# Patient Record
Sex: FEMALE | Race: WHITE | Employment: UNEMPLOYED | URBAN - METROPOLITAN AREA
[De-identification: names, ages, dates, MRNs, and addresses within clinical notes are randomized per-mention and may not be internally consistent; named-entity substitution may affect disease eponyms.]

---
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
## 2022-01-04 ENCOUNTER — OFFICE VISIT (OUTPATIENT)
Dept: GASTROENTEROLOGY | Age: 22
End: 2022-01-04
Payer: COMMERCIAL

## 2022-01-04 VITALS — OXYGEN SATURATION: 100 % | HEIGHT: 64 IN | HEART RATE: 62 BPM | WEIGHT: 233 LBS | BODY MASS INDEX: 39.78 KG/M2

## 2022-01-04 DIAGNOSIS — K21.9 GASTROESOPHAGEAL REFLUX DISEASE WITHOUT ESOPHAGITIS: ICD-10-CM

## 2022-01-04 DIAGNOSIS — K75.9 HEPATITIS: ICD-10-CM

## 2022-01-04 DIAGNOSIS — K75.9 HEPATITIS: Primary | ICD-10-CM

## 2022-01-04 LAB — HEPATITIS B SURFACE ANTIGEN INTERPRETATION: NORMAL

## 2022-01-04 PROCEDURE — G8484 FLU IMMUNIZE NO ADMIN: HCPCS | Performed by: SPECIALIST

## 2022-01-04 PROCEDURE — 99213 OFFICE O/P EST LOW 20 MIN: CPT | Performed by: SPECIALIST

## 2022-01-04 PROCEDURE — G8427 DOCREV CUR MEDS BY ELIG CLIN: HCPCS | Performed by: SPECIALIST

## 2022-01-04 PROCEDURE — G8417 CALC BMI ABV UP PARAM F/U: HCPCS | Performed by: SPECIALIST

## 2022-01-04 PROCEDURE — 1036F TOBACCO NON-USER: CPT | Performed by: SPECIALIST

## 2022-01-04 ASSESSMENT — ENCOUNTER SYMPTOMS
ABDOMINAL PAIN: 0
ABDOMINAL DISTENTION: 0
CONSTIPATION: 1
EYES NEGATIVE: 1
RECTAL PAIN: 0
NAUSEA: 0
DIARRHEA: 0
VOMITING: 0
ANAL BLEEDING: 0
BLOOD IN STOOL: 0
RESPIRATORY NEGATIVE: 1

## 2022-01-04 NOTE — PROGRESS NOTES
Gastroenterology Clinic Follow up Visit    Jael Us  [id number]  Chief Complaint   Patient presents with    Follow-up     Patient following up for alternating constipation and diarrhea. Mentions one episode of prolapse last year. Denies blood per rectum. HPI and A/P at last visit summarized below:  Patient is here for follow-up, constipation is controlled with fiber supplement and MiraLAX as needed, could not continue taking Linzess because of insurance issues. Right upper quadrant ultrasound was unremarkable, left ear shows minimally elevated transaminases most probably steatosis, does not drink alcohol does not use any hormonal preparations. Review of Systems   Constitutional: Negative. HENT: Negative. Eyes: Negative. Respiratory: Negative. Cardiovascular: Negative. Gastrointestinal: Positive for constipation. Negative for abdominal distention, abdominal pain, anal bleeding, blood in stool, diarrhea, nausea, rectal pain and vomiting. Endocrine: Negative. Genitourinary: Negative. Musculoskeletal: Negative. Skin: Negative. Allergic/Immunologic: Negative for food allergies. Neurological: Negative. Hematological: Negative. Psychiatric/Behavioral: Negative. Past medical history, past surgical history, medication list, social and familyhistory reviewed    Pulse 62, height 5' 4\" (1.626 m), weight 233 lb (105.7 kg), SpO2 100 %. Physical Exam  Constitutional:       Appearance: She is well-developed. HENT:      Head: Normocephalic and atraumatic. Eyes:      Conjunctiva/sclera: Conjunctivae normal.      Pupils: Pupils are equal, round, and reactive to light. Cardiovascular:      Rate and Rhythm: Normal rate. Pulmonary:      Effort: Pulmonary effort is normal.   Abdominal:      General: Bowel sounds are normal.      Palpations: Abdomen is soft. Comments: Soft nontender no palpable mass   Musculoskeletal:         General: Normal range of motion. Cervical back: Normal range of motion. Skin:     General: Skin is warm. Neurological:      Mental Status: She is alert. Laboratory, Pathology, Radiology reviewed in detail with relevantimportant investigations summarized below:    No results for input(s): WBC, HGB, HCT, MCV, PLT in the last 720 hours. Lab Results   Component Value Date    ALT 45 (H) 11/23/2021    AST 41 (H) 11/23/2021    ALKPHOS 89 11/23/2021    BILITOT 0.3 11/23/2021     No results found. Endoscopic investigations:     Assessment and Plan:  Maximino Bernal 25 y.o. female for follow up. Constipation controlled with current fiber supplements and MiraLAX as needed. Mildly elevated transaminases with normal alk phos and bilirubin and right upper quadrant ultrasound showed no abnormality of the liver or evidence of gallstones, most probably nonalcoholic fatty liver disease. Will investigate for metabolic autoimmune and viral hepatitis. Advised to lose weight. Diagnosis Orders   1. Hepatitis  Alpha-1-Antitrypsin    MARÍA ELENA    Liver-Kidney Microsome (LKM-1) Ab (IgG)    Anti-smooth muscle antibody    Hepatitis C Antibody    Hepatitis B Surface Antigen    Ceruloplasmin    Copper, Serum    MITOCHONDRIAL ANTIBODY W/REFLEX TITER    Iron And Tibc       Return in about 2 months (around 3/4/2022). Jerri Mann MD   StaffGastroenterologist  [facility]    Please note this report has been partially produced using speech recognitionsoftware  and may cause contain errors related to that system including grammar, punctuation and spelling as well as words andphrases that may seem inappropriate. If there are questions or concerns please feel free to contact me to clarify.

## 2022-01-07 LAB
ALPHA-1 ANTITRYPSIN: 158 MG/DL (ref 90–200)
COPPER: 154 UG/DL (ref 80–155)
LIVER-KIDNEY MICROSOME-1 AB IGG: 1.1 U (ref 0–24.9)
MITOCHONDRIAL M2 AB, IGG: <0.5 U/ML (ref 0–4)

## 2022-01-08 LAB
ANTINUCLEAR AB INTERPRETIVE COMMENT: NORMAL
ANTINUCLEAR ANTIBODY, HEP-2, IGG: NORMAL

## 2022-01-11 LAB — F-ACTIN AB IGA: 10.2 UNITS (ref 0–24.9)

## 2022-03-31 ENCOUNTER — OFFICE VISIT (OUTPATIENT)
Dept: GASTROENTEROLOGY | Age: 22
End: 2022-03-31
Payer: COMMERCIAL

## 2022-03-31 VITALS
HEART RATE: 77 BPM | HEIGHT: 64 IN | OXYGEN SATURATION: 96 % | WEIGHT: 226.4 LBS | SYSTOLIC BLOOD PRESSURE: 116 MMHG | BODY MASS INDEX: 38.65 KG/M2 | DIASTOLIC BLOOD PRESSURE: 70 MMHG

## 2022-03-31 DIAGNOSIS — K59.00 CONSTIPATION, UNSPECIFIED CONSTIPATION TYPE: ICD-10-CM

## 2022-03-31 DIAGNOSIS — R79.89 ABNORMAL LFTS: Primary | ICD-10-CM

## 2022-03-31 DIAGNOSIS — R79.89 ABNORMAL LFTS: ICD-10-CM

## 2022-03-31 LAB
ALBUMIN SERPL-MCNC: 4.3 G/DL (ref 3.5–4.6)
ALP BLD-CCNC: 95 U/L (ref 40–130)
ALT SERPL-CCNC: 22 U/L (ref 0–33)
AST SERPL-CCNC: 19 U/L (ref 0–35)
BILIRUB SERPL-MCNC: <0.2 MG/DL (ref 0.2–0.7)
BILIRUBIN DIRECT: <0.2 MG/DL (ref 0–0.4)
BILIRUBIN, INDIRECT: NORMAL MG/DL (ref 0–0.6)
T4 FREE: 1.22 NG/DL (ref 0.84–1.68)
TOTAL PROTEIN: 7.4 G/DL (ref 6.3–8)
TSH REFLEX: 5.15 UIU/ML (ref 0.44–3.86)

## 2022-03-31 PROCEDURE — G8417 CALC BMI ABV UP PARAM F/U: HCPCS | Performed by: SPECIALIST

## 2022-03-31 PROCEDURE — 99213 OFFICE O/P EST LOW 20 MIN: CPT | Performed by: SPECIALIST

## 2022-03-31 PROCEDURE — G8427 DOCREV CUR MEDS BY ELIG CLIN: HCPCS | Performed by: SPECIALIST

## 2022-03-31 PROCEDURE — 1036F TOBACCO NON-USER: CPT | Performed by: SPECIALIST

## 2022-03-31 PROCEDURE — G8484 FLU IMMUNIZE NO ADMIN: HCPCS | Performed by: SPECIALIST

## 2022-03-31 RX ORDER — POLYETHYLENE GLYCOL 3350 17 G/17G
17 POWDER, FOR SOLUTION ORAL DAILY PRN
COMMUNITY

## 2022-03-31 ASSESSMENT — ENCOUNTER SYMPTOMS
ABDOMINAL PAIN: 0
ANAL BLEEDING: 0
RECTAL PAIN: 0
RESPIRATORY NEGATIVE: 1
NAUSEA: 0
CONSTIPATION: 1
EYES NEGATIVE: 1
ABDOMINAL DISTENTION: 0
DIARRHEA: 0
BLOOD IN STOOL: 0
VOMITING: 0

## 2022-03-31 NOTE — PROGRESS NOTES
Gastroenterology Clinic Follow up Visit    Sandy Woody  [id number]  Chief Complaint   Patient presents with    Follow-up       HPI and A/P at last visit summarized below:  Patient is here for follow-up. Mildly elevated transaminases. She was investigated for autoimmune liver disease that was negative hepatitis B surface antigen was negative and also hep C antibody still pending, patient takes stool softener and probiotics and high-fiber diet, prune juice. Also takes MiraLAX as needed. No history of rectal bleeding. Review of Systems   Constitutional: Negative. HENT: Negative. Eyes: Negative. Respiratory: Negative. Cardiovascular: Negative. Gastrointestinal: Positive for constipation. Negative for abdominal distention, abdominal pain, anal bleeding, blood in stool, diarrhea, nausea, rectal pain and vomiting. Endocrine: Negative. Genitourinary: Negative. Musculoskeletal: Negative. Skin: Negative. Allergic/Immunologic: Negative for food allergies. Neurological: Negative. Hematological: Negative. Psychiatric/Behavioral: Negative. Past medical history, past surgical history, medication list, social and familyhistory reviewed    Blood pressure 116/70, pulse 77, height 5' 4\" (1.626 m), weight 226 lb 6.4 oz (102.7 kg), SpO2 96 %. Physical Exam  Constitutional:       Appearance: She is well-developed. HENT:      Head: Normocephalic and atraumatic. Eyes:      Conjunctiva/sclera: Conjunctivae normal.      Pupils: Pupils are equal, round, and reactive to light. Cardiovascular:      Rate and Rhythm: Normal rate. Pulmonary:      Effort: Pulmonary effort is normal.   Abdominal:      General: Bowel sounds are normal.      Palpations: Abdomen is soft. Comments: Soft nontender no palpable mass   Musculoskeletal:         General: Normal range of motion. Cervical back: Normal range of motion. Skin:     General: Skin is warm.    Neurological:      Mental

## 2022-04-01 DIAGNOSIS — R94.6 BORDERLINE ABNORMAL TFTS: Primary | ICD-10-CM

## 2022-04-01 LAB
HEPATITIS C ANTIBODY: NONREACTIVE
IRON SATURATION: 19 % (ref 20–55)
IRON: 62 UG/DL (ref 37–145)
TOTAL IRON BINDING CAPACITY: 321 UG/DL (ref 250–450)
UNSATURATED IRON BINDING CAPACITY: 259 UG/DL (ref 112–347)

## 2022-04-19 ENCOUNTER — OFFICE VISIT (OUTPATIENT)
Dept: ENDOCRINOLOGY | Age: 22
End: 2022-04-19
Payer: COMMERCIAL

## 2022-04-19 VITALS
SYSTOLIC BLOOD PRESSURE: 115 MMHG | HEIGHT: 64 IN | WEIGHT: 227 LBS | DIASTOLIC BLOOD PRESSURE: 81 MMHG | HEART RATE: 65 BPM | OXYGEN SATURATION: 98 % | BODY MASS INDEX: 38.76 KG/M2

## 2022-04-19 DIAGNOSIS — E88.81 INSULIN RESISTANCE: ICD-10-CM

## 2022-04-19 DIAGNOSIS — E04.9 GOITER: ICD-10-CM

## 2022-04-19 DIAGNOSIS — E03.9 HYPOTHYROIDISM, UNSPECIFIED TYPE: Primary | ICD-10-CM

## 2022-04-19 DIAGNOSIS — R61 HYPERHIDROSIS: ICD-10-CM

## 2022-04-19 DIAGNOSIS — E66.01 MORBID OBESITY (HCC): ICD-10-CM

## 2022-04-19 DIAGNOSIS — G47.33 OBSTRUCTIVE SLEEP APNEA SYNDROME: ICD-10-CM

## 2022-04-19 PROCEDURE — G8427 DOCREV CUR MEDS BY ELIG CLIN: HCPCS | Performed by: INTERNAL MEDICINE

## 2022-04-19 PROCEDURE — G8417 CALC BMI ABV UP PARAM F/U: HCPCS | Performed by: INTERNAL MEDICINE

## 2022-04-19 PROCEDURE — 1036F TOBACCO NON-USER: CPT | Performed by: INTERNAL MEDICINE

## 2022-04-19 PROCEDURE — 99203 OFFICE O/P NEW LOW 30 MIN: CPT | Performed by: INTERNAL MEDICINE

## 2022-04-19 RX ORDER — LEVOTHYROXINE SODIUM 0.03 MG/1
25 TABLET ORAL DAILY
Qty: 30 TABLET | Refills: 5 | Status: SHIPPED | OUTPATIENT
Start: 2022-04-19

## 2022-04-19 ASSESSMENT — ENCOUNTER SYMPTOMS: CONSTIPATION: 1

## 2022-04-19 NOTE — PROGRESS NOTES
4/19/2022    Assessment:       Diagnosis Orders   1. Hypothyroidism, unspecified type     2. Morbid obesity ([address]     3. Obstructive sleep apnea syndrome     4. Hyperhidrosis     5. Insulin resistance         PLAN:     Orders Placed This Encounter   Procedures    US HEAD NECK SOFT TISSUE THYROID     Standing Status:   Future     Number of Occurrences:   1     Standing Expiration Date:   4/19/2023    T4, Free     Standing Status:   Future     Standing Expiration Date:   4/19/2023    TSH with Reflex     Standing Status:   Future     Standing Expiration Date:   4/19/2023    Thyroid Peroxidase Antibody     Standing Status:   Future     Standing Expiration Date:   4/19/2023    Insulin, Total     Standing Status:   Future     Standing Expiration Date:   4/19/2023   Janey Engel MD, Pulmonology, [place]     Referral Priority:   Routine     Referral Type:   Eval and Treat     Referral Reason:   Specialty Services Required     Referred to Provider:   Korey Ramirez MD     Requested Specialty:   Pulmonology     Number of Visits Requested:   1     Orders Placed This Encounter   Medications    levothyroxine (SYNTHROID) 25 MCG tablet     Sig: Take 1 tablet by mouth daily     Dispense:  30 tablet     Refill:  5     Start patient on Synthroid 25 mcg daily refer patient to pulmonary for evaluation of possible sleep management sleep apnea  More than 50% of 35-minute spent patient education counseling    Subjective:     Chief Complaint   Patient presents with    New Patient     Borderline abnormal TFTs     Vitals:    04/19/22 1056   BP: 115/81   Pulse: 65   SpO2: 98%   Weight: 227 lb (103 kg)   Height: 5' 4\" (1.626 m)     Wt Readings from Last 3 Encounters:   04/19/22 227 lb (103 kg) (99 %, Z= 2.29)*   03/31/22 226 lb 6.4 oz (102.7 kg) (99 %, Z= 2.28)*   01/04/22 233 lb (105.7 kg) (>99 %, Z= 2.35)*     * Growth percentiles are based on CDC (Girls, 2-20 Years) data.      BP Readings from Last 3 Encounters: 04/19/22 115/81   03/31/22 116/70   11/23/21 106/76     Patient referred here for hypothyroidism thyroid function tests were done twice showing elevated TSH does complain of fatigue difficulty losing weight does complain of constipation has been seeing gastroenterologist recently rest of the chemistries have been normal  Patient also has signs symptoms of sleep apnea including daytime sleepiness increasing fatigue possible snoring at night    Other  This is a new (Hypothyroidism) problem. The current episode started more than 1 month ago. The problem occurs intermittently. The problem has been waxing and waning. Associated symptoms include fatigue. Pertinent negatives include no neck pain or swollen glands. Nothing aggravates the symptoms. She has tried nothing for the symptoms. The treatment provided no relief. History reviewed. No pertinent past medical history. History reviewed. No pertinent surgical history. Social History     Socioeconomic History    Marital status: Single     Spouse name: Not on file    Number of children: Not on file    Years of education: Not on file    Highest education level: Not on file   Occupational History    Not on file   Tobacco Use    Smoking status: Never Smoker    Smokeless tobacco: Never Used   Substance and Sexual Activity    Alcohol use: Never    Drug use: Never    Sexual activity: Not on file   Other Topics Concern    Not on file   Social History Narrative    Not on file     Social Determinants of Health     Financial Resource Strain:     Difficulty of Paying Living Expenses: Not on file   Food Insecurity:     Worried About Running Out of Food in the Last Year: Not on file    Rob of Food in the Last Year: Not on file   Transportation Needs:     Lack of Transportation (Medical): Not on file    Lack of Transportation (Non-Medical):  Not on file   Physical Activity:     Days of Exercise per Week: Not on file    Minutes of Exercise per Session: Not on file   Stress:     Feeling of Stress : Not on file   Social Connections:     Frequency of Communication with Friends and Family: Not on file    Frequency of Social Gatherings with Friends and Family: Not on file    Attends Rastafari Services: Not on file    Active Member of Clubs or Organizations: Not on file    Attends Club or Organization Meetings: Not on file    Marital Status: Not on file   Intimate Partner Violence:     Fear of Current or Ex-Partner: Not on file    Emotionally Abused: Not on file    Physically Abused: Not on file    Sexually Abused: Not on file   Housing Stability:     Unable to Pay for Housing in the Last Year: Not on file    Number of [place] in the Last Year: Not on file    Unstable Housing in the Last Year: Not on file     History reviewed. No pertinent family history. No Known Allergies    Current Outpatient Medications:     polyethylene glycol (GLYCOLAX) 17 GM/SCOOP powder, Take 17 g by mouth daily as needed, Disp: , Rfl:     pantoprazole (PROTONIX) 20 MG tablet, take 1 tablet by mouth once daily, Disp: 30 tablet, Rfl: 5    Psyllium (DAILY FIBER PO), Take 1 capsule by mouth daily, Disp: , Rfl:     Probiotic Product (PROBIOTIC-10 PO), Take 1 capsule by mouth daily, Disp: , Rfl:     Docusate Sodium 100 MG TABS, Take 1 tablet by mouth daily as needed , Disp: , Rfl:   Lab Results   Component Value Date    PROT 7.4 03/31/2022    LABALBU 4.3 03/31/2022    BILITOT <0.2 03/31/2022    ALKPHOS 95 03/31/2022    AST 19 03/31/2022    ALT 22 03/31/2022     No results found for: WBC, HGB, HCT, MCV, PLT  No results found for: LABA1C  No results found for: CHOLFAST, TRIGLYCFAST, HDL, LDLCALC, CHOL, TRIG  No results found for: TESTM  Lab Results   Component Value Date    TSHREFLEX 5.150 (H) 03/31/2022    T4FREE 1.22 03/31/2022     No results found for: TPOABS    Review of Systems   Constitutional: Positive for fatigue and unexpected weight change.         Excessive sweating   HENT: Negative for trouble swallowing. Eyes: Negative. Respiratory: Negative. Cardiovascular: Negative. Gastrointestinal: Positive for constipation. Endocrine: Negative. Genitourinary: Negative. Musculoskeletal: Negative for neck pain. Psychiatric/Behavioral: Positive for sleep disturbance. Objective:   Physical Exam  Vitals reviewed. Constitutional:       General: She is not in acute distress. Appearance: Normal appearance. She is obese. HENT:      Head: Normocephalic and atraumatic. Right Ear: External ear normal.      Left Ear: External ear normal.      Nose: Nose normal.      Mouth/Throat:      Mouth: Mucous membranes are moist.   Eyes:      General: No scleral icterus. Right eye: No discharge. Left eye: No discharge. Extraocular Movements: Extraocular movements intact. Conjunctiva/sclera: Conjunctivae normal.   Neck:      Thyroid: Thyromegaly present. Cardiovascular:      Rate and Rhythm: Normal rate and regular rhythm. Heart sounds: Normal heart sounds. Pulmonary:      Effort: Pulmonary effort is normal.      Breath sounds: Normal breath sounds. Abdominal:      Palpations: Abdomen is soft. Musculoskeletal:         General: Normal range of motion. Cervical back: Normal range of motion and neck supple. Skin:     General: Skin is warm and dry. Neurological:      General: No focal deficit present. Mental Status: She is alert and oriented to person, place, and time.    Psychiatric:         Mood and Affect: Mood normal.         Behavior: Behavior normal.

## 2022-04-22 ENCOUNTER — HOSPITAL ENCOUNTER (OUTPATIENT)
Dept: LAB | Age: 22
Discharge: HOME | End: 2022-04-22
Payer: COMMERCIAL

## 2022-04-22 ENCOUNTER — HOSPITAL ENCOUNTER (OUTPATIENT)
Dept: ULTRASOUND IMAGING | Age: 22
Discharge: HOME | End: 2022-04-24
Payer: COMMERCIAL

## 2022-04-22 DIAGNOSIS — E04.9 GOITER: ICD-10-CM

## 2022-04-22 LAB
T4 FREE: 1.18 NG/DL (ref 0.84–1.68)
TSH REFLEX: 3.99 UIU/ML (ref 0.44–3.86)

## 2022-04-22 PROCEDURE — 83525 ASSAY OF INSULIN: CPT

## 2022-04-22 PROCEDURE — 84439 ASSAY OF FREE THYROXINE: CPT

## 2022-04-22 PROCEDURE — 76536 US EXAM OF HEAD AND NECK: CPT

## 2022-04-22 PROCEDURE — 84443 ASSAY THYROID STIM HORMONE: CPT

## 2022-04-22 PROCEDURE — 86376 MICROSOMAL ANTIBODY EACH: CPT

## 2022-04-23 LAB
INSULIN COMMENT: NORMAL
INSULIN REFERENCE RANGE:: NORMAL
INSULIN: 16.1 MU/L

## 2022-04-23 ASSESSMENT — ENCOUNTER SYMPTOMS
TROUBLE SWALLOWING: 0
SWOLLEN GLANDS: 0
RESPIRATORY NEGATIVE: 1
EYES NEGATIVE: 1

## 2022-04-24 LAB
MISCELLANEOUS LAB TEST ORDER: ABNORMAL
WHOPPER PROMPT: ABNORMAL

## 2022-04-25 ENCOUNTER — OFFICE VISIT (OUTPATIENT)
Dept: PULMONOLOGY | Age: 22
End: 2022-04-25
Payer: COMMERCIAL

## 2022-04-25 VITALS
TEMPERATURE: 98.2 F | HEIGHT: 64 IN | OXYGEN SATURATION: 99 % | BODY MASS INDEX: 38.24 KG/M2 | WEIGHT: 224 LBS | HEART RATE: 80 BPM | SYSTOLIC BLOOD PRESSURE: 124 MMHG | DIASTOLIC BLOOD PRESSURE: 60 MMHG

## 2022-04-25 DIAGNOSIS — E66.9 OBESITY (BMI 30-39.9): ICD-10-CM

## 2022-04-25 DIAGNOSIS — G47.30 SLEEP APNEA, UNSPECIFIED TYPE: Primary | ICD-10-CM

## 2022-04-25 PROCEDURE — G8427 DOCREV CUR MEDS BY ELIG CLIN: HCPCS | Performed by: INTERNAL MEDICINE

## 2022-04-25 PROCEDURE — G8417 CALC BMI ABV UP PARAM F/U: HCPCS | Performed by: INTERNAL MEDICINE

## 2022-04-25 PROCEDURE — 99204 OFFICE O/P NEW MOD 45 MIN: CPT | Performed by: INTERNAL MEDICINE

## 2022-04-25 PROCEDURE — 1036F TOBACCO NON-USER: CPT | Performed by: INTERNAL MEDICINE

## 2022-04-25 ASSESSMENT — ENCOUNTER SYMPTOMS
VOICE CHANGE: 0
VOMITING: 0
NAUSEA: 0
RHINORRHEA: 0
SHORTNESS OF BREATH: 0
EYE ITCHING: 0
SINUS PRESSURE: 0
DIARRHEA: 0
WHEEZING: 0
EYE DISCHARGE: 0
CHEST TIGHTNESS: 0
ABDOMINAL PAIN: 0
COUGH: 0
TROUBLE SWALLOWING: 0
SORE THROAT: 0

## 2022-04-25 NOTE — PROGRESS NOTES
Subjective:     Abran James is a 25 y.o. female who complains today of:     Chief Complaint   Patient presents with    New Patient     BRAYAN       HPI  She is complaining of snoring, no  daytime sleepiness and tiredness. She does not have good habit of sleep. She stay up at night and sleep during daytime but not all the time. No C/o witness apnea. She does not wakes up with gasping for air. No C/o wakes up frequently during sleep . No complaint of morning headache. She does not have restful sleep. She does not take daily naps. She does not fall asleep while watching TV. She does not drive  She does not have difficulty falling sleep or staying asleep  No significant Weight gain in last 6-12 month . No sleep walking , talking or eating . No sleep onset hallucination. No sleep paralysis   No sleep attacks. Allergies:  Patient has no known allergies. No past medical history on file. No past surgical history on file. No family history on file. Social History     Socioeconomic History    Marital status: Single     Spouse name: Not on file    Number of children: Not on file    Years of education: Not on file    Highest education level: Not on file   Occupational History    Not on file   Tobacco Use    Smoking status: Never Smoker    Smokeless tobacco: Never Used   Substance and Sexual Activity    Alcohol use: Never    Drug use: Never    Sexual activity: Not on file   Other Topics Concern    Not on file   Social History Narrative    Not on file     Social Determinants of Health     Financial Resource Strain:     Difficulty of Paying Living Expenses: Not on file   Food Insecurity:     Worried About Running Out of Food in the Last Year: Not on file    Rob of Food in the Last Year: Not on file   Transportation Needs:     Lack of Transportation (Medical): Not on file    Lack of Transportation (Non-Medical):  Not on file   Physical Activity:     Days of Exercise per Week: Not on file    Minutes of Exercise per Session: Not on file   Stress:     Feeling of Stress : Not on file   Social Connections:     Frequency of Communication with Friends and Family: Not on file    Frequency of Social Gatherings with Friends and Family: Not on file    Attends Holiness Services: Not on file    Active Member of Clubs or Organizations: Not on file    Attends Club or Organization Meetings: Not on file    Marital Status: Not on file   Intimate Partner Violence:     Fear of Current or Ex-Partner: Not on file    Emotionally Abused: Not on file    Physically Abused: Not on file    Sexually Abused: Not on file   Housing Stability:     Unable to Pay for Housing in the Last Year: Not on file    Number of [place] in the Last Year: Not on file    Unstable Housing in the Last Year: Not on file         Review of Systems   Constitutional: Negative for chills, diaphoresis, fatigue and fever. HENT: Negative for congestion, mouth sores, nosebleeds, postnasal drip, rhinorrhea, sinus pressure, sneezing, sore throat, trouble swallowing and voice change. Eyes: Negative for discharge, itching and visual disturbance. Respiratory: Negative for cough, chest tightness, shortness of breath and wheezing. Cardiovascular: Negative for chest pain, palpitations and leg swelling. Gastrointestinal: Negative for abdominal pain, diarrhea, nausea and vomiting. Genitourinary: Negative for difficulty urinating and hematuria. Musculoskeletal: Negative for arthralgias, joint swelling and myalgias. Skin: Negative for rash. Allergic/Immunologic: Negative for environmental allergies and food allergies. Neurological: Negative for dizziness, tremors, weakness and headaches. Psychiatric/Behavioral: Positive for sleep disturbance. Negative for behavioral problems.          :     Vitals:    04/25/22 1057   BP: 124/60   Pulse: 80   Temp: 98.2 °F (36.8 °C)   SpO2: 99%   Weight: 224 lb (101.6 kg)   Height: 5' 4\" visit. Assessment/Plan:     1. Sleep apnea, unspecified type  She is complaining of snoring, no daytime sleepiness and tiredness. She does not have good habit of sleep. She stay up at night and sleep during daytime but not all the time. She does not have restful sleep. She does not take daily naps. She does not fall asleep while watching TV.   she is willing to go for sleep study to r/o BRAYAN     - Baseline Diagnostic Sleep Study; Future    2. Obesity (BMI 30-39. 9)  She is advised try to lose weight. obesity related risk explained to the patient ,  Current weight:  224 lb (101.6 kg) . BMI:  Body mass index is 38.45 kg/m². Suggested weight control approaches, including dietary changes , exercise, behavioral modification. Return in about 4 weeks (around 5/23/2022) for brayan, sleep study.       Diana De Anda MD

## 2022-06-30 ENCOUNTER — OFFICE VISIT (OUTPATIENT)
Dept: GASTROENTEROLOGY | Age: 22
End: 2022-06-30
Payer: COMMERCIAL

## 2022-06-30 VITALS
SYSTOLIC BLOOD PRESSURE: 104 MMHG | HEART RATE: 59 BPM | OXYGEN SATURATION: 98 % | WEIGHT: 233.2 LBS | BODY MASS INDEX: 39.81 KG/M2 | HEIGHT: 64 IN | DIASTOLIC BLOOD PRESSURE: 60 MMHG

## 2022-06-30 DIAGNOSIS — K21.9 GASTROESOPHAGEAL REFLUX DISEASE, UNSPECIFIED WHETHER ESOPHAGITIS PRESENT: ICD-10-CM

## 2022-06-30 DIAGNOSIS — R79.89 ABNORMAL LFTS: Primary | ICD-10-CM

## 2022-06-30 DIAGNOSIS — K21.9 GASTROESOPHAGEAL REFLUX DISEASE WITHOUT ESOPHAGITIS: ICD-10-CM

## 2022-06-30 PROCEDURE — 1036F TOBACCO NON-USER: CPT | Performed by: SPECIALIST

## 2022-06-30 PROCEDURE — G8417 CALC BMI ABV UP PARAM F/U: HCPCS | Performed by: SPECIALIST

## 2022-06-30 PROCEDURE — 99212 OFFICE O/P EST SF 10 MIN: CPT | Performed by: SPECIALIST

## 2022-06-30 PROCEDURE — G8427 DOCREV CUR MEDS BY ELIG CLIN: HCPCS | Performed by: SPECIALIST

## 2022-06-30 RX ORDER — PANTOPRAZOLE SODIUM 20 MG/1
TABLET, DELAYED RELEASE ORAL
Qty: 30 TABLET | Refills: 5 | Status: SHIPPED | OUTPATIENT
Start: 2022-06-30

## 2022-06-30 ASSESSMENT — ENCOUNTER SYMPTOMS
VOMITING: 0
ANAL BLEEDING: 0
NAUSEA: 0
ABDOMINAL PAIN: 0
BLOOD IN STOOL: 0
ABDOMINAL DISTENTION: 0
EYES NEGATIVE: 1
RESPIRATORY NEGATIVE: 1
GASTROINTESTINAL NEGATIVE: 1
DIARRHEA: 0
CONSTIPATION: 0
RECTAL PAIN: 0

## 2022-06-30 NOTE — PROGRESS NOTES
Gastroenterology Clinic Follow up Visit    Damon Kan  [id number]  Chief Complaint   Patient presents with    3 Month Follow-Up    Medication Refill       HPI and A/P at last visit summarized below:  Patient is here for follow-up, function panel from March 31, 2022 shows normal transaminases alk phos and bilirubin. C antibodies negative, patient is seeing Dr. Rosa Mahoney for thyroid issues. No GI issues, patient had GERD and symptoms are controlled with pantoprazole 40 mg once a day    Review of Systems   Constitutional: Negative. HENT: Negative. Eyes: Negative. Respiratory: Negative. Cardiovascular: Negative. Gastrointestinal: Negative. Negative for abdominal distention, abdominal pain, anal bleeding, blood in stool, constipation, diarrhea, nausea, rectal pain and vomiting. No active GI issues   Endocrine: Negative. Genitourinary: Negative. Musculoskeletal: Negative. Skin: Negative. Allergic/Immunologic: Negative for food allergies. Neurological: Negative. Hematological: Negative. Psychiatric/Behavioral: Negative. Past medical history, past surgical history, medication list, social and familyhistory reviewed    Blood pressure 104/60, pulse 59, height 5' 4\" (1.626 m), weight 233 lb 3.2 oz (105.8 kg), SpO2 98 %. Physical Exam  Constitutional:       Appearance: She is well-developed. HENT:      Head: Normocephalic and atraumatic. Eyes:      Conjunctiva/sclera: Conjunctivae normal.      Pupils: Pupils are equal, round, and reactive to light. Cardiovascular:      Rate and Rhythm: Normal rate. Pulmonary:      Effort: Pulmonary effort is normal.   Abdominal:      General: Bowel sounds are normal.      Palpations: Abdomen is soft. Comments: Soft, non-tender. Musculoskeletal:         General: Normal range of motion. Cervical back: Normal range of motion. Skin:     General: Skin is warm. Neurological:      Mental Status: She is alert. Laboratory, Pathology, Radiology reviewed in detail with relevantimportant investigations summarized below:    No results for input(s): WBC, HGB, HCT, MCV, PLT in the last 720 hours. Lab Results   Component Value Date    ALT 22 03/31/2022    AST 19 03/31/2022    ALKPHOS 95 03/31/2022    BILITOT <0.2 03/31/2022     No results found. Endoscopic investigations:     Assessment and Plan:  [place] 23 y.o. female for follow up. Recent hepatic function panel was normal and also hep C antibody was negative. GERD controlled with Protonix 40 mg once a day  follow-up PRN. Diagnosis Orders   1. Abnormal LFTs     2. Gastroesophageal reflux disease without esophagitis  pantoprazole (PROTONIX) 20 MG tablet   3. Gastroesophageal reflux disease, unspecified whether esophagitis present         No follow-ups on file. Jamie Tadeo MD   StaffGastroenterologist  [facility]    Please note this report has been partially produced using speech recognitionsoftware  and may cause contain errors related to that system including grammar, punctuation and spelling as well as words andphrases that may seem inappropriate. If there are questions or concerns please feel free to contact me to clarify.

## 2022-07-14 ENCOUNTER — HOSPITAL ENCOUNTER (OUTPATIENT)
Dept: SLEEP CENTER | Age: 22
Discharge: HOME | End: 2022-07-16
Payer: COMMERCIAL

## 2022-07-14 PROCEDURE — 95810 POLYSOM 6/> YRS 4/> PARAM: CPT

## 2022-07-15 PROCEDURE — 95810 POLYSOM 6/> YRS 4/> PARAM: CPT | Performed by: INTERNAL MEDICINE

## 2022-07-17 PROBLEM — G47.30 SLEEP APNEA: Status: ACTIVE | Noted: 2022-07-17

## 2022-07-20 DIAGNOSIS — G47.30 SLEEP APNEA, UNSPECIFIED TYPE: ICD-10-CM

## 2022-07-25 ENCOUNTER — OFFICE VISIT (OUTPATIENT)
Dept: ENDOCRINOLOGY | Age: 22
End: 2022-07-25
Payer: COMMERCIAL

## 2022-07-25 VITALS
SYSTOLIC BLOOD PRESSURE: 111 MMHG | OXYGEN SATURATION: 97 % | WEIGHT: 234 LBS | HEIGHT: 64 IN | HEART RATE: 57 BPM | DIASTOLIC BLOOD PRESSURE: 75 MMHG | BODY MASS INDEX: 39.95 KG/M2

## 2022-07-25 DIAGNOSIS — E03.9 HYPOTHYROIDISM, UNSPECIFIED TYPE: ICD-10-CM

## 2022-07-25 DIAGNOSIS — E03.9 HYPOTHYROIDISM, UNSPECIFIED TYPE: Primary | ICD-10-CM

## 2022-07-25 LAB
T4 FREE: 1.12 NG/DL (ref 0.84–1.68)
TSH SERPL DL<=0.05 MIU/L-ACNC: 3.19 UIU/ML (ref 0.44–3.86)

## 2022-07-25 PROCEDURE — 1036F TOBACCO NON-USER: CPT | Performed by: INTERNAL MEDICINE

## 2022-07-25 PROCEDURE — 99213 OFFICE O/P EST LOW 20 MIN: CPT | Performed by: INTERNAL MEDICINE

## 2022-07-25 PROCEDURE — G8427 DOCREV CUR MEDS BY ELIG CLIN: HCPCS | Performed by: INTERNAL MEDICINE

## 2022-07-25 PROCEDURE — G8417 CALC BMI ABV UP PARAM F/U: HCPCS | Performed by: INTERNAL MEDICINE

## 2022-07-25 NOTE — PROGRESS NOTES
were obtained. The right lobe measures 5 x 1.8 x 1.8 cm with a volume of 8.4 mL. The left lobe measures 5.1 x 1.8 x 1.4 cm with a volume of 6.7 mL. The isthmus measures 0.37 cm. The thyroid gland is normal in size. Left thyroid nodules, see below. Thyroid gland has a heterogeneous echotexture. The largest lymph node is located in the right-sided the neck measures 2.1 x 0.9 0.5 cm. The cortex is not thickened. Right Lobe:  Unremarkable. Left Lobe:     1. Location: Mid/anterior   Size: 1 x 0.7 x 0.5 cm   Composition: Solid or almost completely solid: 2 Points   Echogenicity:Hypoechoic: 2 Points   Shape: Wider than tall : 0 Points   Margin: Smooth: 0 Points   Echogenic Foci: None or large comet tail artifacts: 0 Points   ACR Ti-Rads Category : TR 4: 4-6 Points       Moderately suspicious     FNA if greater than 1.5 cm     Follow if greater than 1 cm   Follow-Up : Recommend follow-up in one year. 2. Location: Inferior   Size: 0.5 x 0.3 x 0.5 cm   Composition: Cystic : 0 Points   Echogenicity:Anechoic: 0 Points   Shape: Wider than tall : 0 Points   Margin: Smooth: 0 Points   Echogenic Foci: None or large comet tail artifacts: 0 Points   ACR Ti-Rads Category : TR 1: 0 Points        Benign, no FNA, no follow-up necessary. Follow-Up : No follow-up needed according to ACR white paper recommendations       Isthmus: Unremarkable. Impression       NORMAL SIZE THYROID GLAND       1 CM MODERATELY SUSPICIOUS NODULE RIGHT LOBE. RECOMMEND FOLLOW-UP EXAM IN ONE YEAR FOLLOWING ACR GUIDELINES. No past medical history on file. No past surgical history on file.   Social History     Socioeconomic History    Marital status: Single     Spouse name: Not on file    Number of children: Not on file    Years of education: Not on file    Highest education level: Not on file   Occupational History    Not on file   Tobacco Use    Smoking status: Never    Smokeless tobacco: Never   Substance and Sexual Activity    Alcohol use: Never    Drug use: Never    Sexual activity: Not on file   Other Topics Concern    Not on file   Social History Narrative    Not on file     Social Determinants of Health     Financial Resource Strain: Not on file   Food Insecurity: Not on file   Transportation Needs: Not on file   Physical Activity: Not on file   Stress: Not on file   Social Connections: Not on file   Intimate Partner Violence: Not on file   Housing Stability: Not on file     No family history on file. No Known Allergies    Current Outpatient Medications:     pantoprazole (PROTONIX) 20 MG tablet, take 1 tablet by mouth once daily, Disp: 30 tablet, Rfl: 5    levothyroxine (SYNTHROID) 25 MCG tablet, Take 1 tablet by mouth daily, Disp: 30 tablet, Rfl: 5    polyethylene glycol (GLYCOLAX) 17 GM/SCOOP powder, Take 17 g by mouth daily as needed, Disp: , Rfl:     Psyllium (DAILY FIBER PO), Take 1 capsule by mouth daily, Disp: , Rfl:     Probiotic Product (PROBIOTIC-10 PO), Take 1 capsule by mouth daily, Disp: , Rfl:   Lab Results   Component Value Date    PROT 7.4 03/31/2022    LABALBU 4.3 03/31/2022    BILITOT <0.2 03/31/2022    ALKPHOS 95 03/31/2022    AST 19 03/31/2022    ALT 22 03/31/2022     No results found for: WBC, HGB, HCT, MCV, PLT  No results found for: LABA1C  No results found for: CHOLFAST, TRIGLYCFAST, HDL, LDLCALC, CHOL, TRIG  No results found for: TESTM  Lab Results   Component Value Date    TSHREFLEX 3.990 (H) 04/22/2022    TSHREFLEX 5.150 (H) 03/31/2022    T4FREE 1.18 04/22/2022    T4FREE 1.22 03/31/2022     No results found for: TPOABS    Review of Systems   Constitutional:  Positive for fatigue. Endocrine: Negative. Musculoskeletal:  Negative for neck pain. All other systems reviewed and are negative. Objective:   Physical Exam  Vitals reviewed. Constitutional:       General: She is not in acute distress. Appearance: Normal appearance. She is obese.    HENT:      Head: Normocephalic and atraumatic. Right Ear: External ear normal.      Left Ear: External ear normal.      Nose: Nose normal.   Eyes:      General: No scleral icterus. Right eye: No discharge. Left eye: No discharge. Extraocular Movements: Extraocular movements intact. Conjunctiva/sclera: Conjunctivae normal.   Cardiovascular:      Rate and Rhythm: Normal rate. Pulmonary:      Effort: Pulmonary effort is normal.   Musculoskeletal:         General: Normal range of motion. Cervical back: Normal range of motion and neck supple. Neurological:      General: No focal deficit present. Mental Status: She is alert and oriented to person, place, and time.    Psychiatric:         Mood and Affect: Mood normal.         Behavior: Behavior normal.

## 2022-08-11 ENCOUNTER — OFFICE VISIT (OUTPATIENT)
Dept: PULMONOLOGY | Age: 22
End: 2022-08-11
Payer: COMMERCIAL

## 2022-08-11 VITALS
HEIGHT: 64 IN | SYSTOLIC BLOOD PRESSURE: 119 MMHG | OXYGEN SATURATION: 100 % | DIASTOLIC BLOOD PRESSURE: 74 MMHG | HEART RATE: 64 BPM | WEIGHT: 224 LBS | TEMPERATURE: 97.6 F | BODY MASS INDEX: 38.24 KG/M2

## 2022-08-11 DIAGNOSIS — E66.9 OBESITY (BMI 30-39.9): ICD-10-CM

## 2022-08-11 DIAGNOSIS — G47.30 SLEEP APNEA, UNSPECIFIED TYPE: Primary | ICD-10-CM

## 2022-08-11 PROCEDURE — 1036F TOBACCO NON-USER: CPT | Performed by: INTERNAL MEDICINE

## 2022-08-11 PROCEDURE — 99214 OFFICE O/P EST MOD 30 MIN: CPT | Performed by: INTERNAL MEDICINE

## 2022-08-11 PROCEDURE — G8417 CALC BMI ABV UP PARAM F/U: HCPCS | Performed by: INTERNAL MEDICINE

## 2022-08-11 PROCEDURE — G8427 DOCREV CUR MEDS BY ELIG CLIN: HCPCS | Performed by: INTERNAL MEDICINE

## 2022-08-11 ASSESSMENT — ENCOUNTER SYMPTOMS
TROUBLE SWALLOWING: 0
SINUS PRESSURE: 0
SORE THROAT: 0
SHORTNESS OF BREATH: 0
DIARRHEA: 0
NAUSEA: 0
VOICE CHANGE: 0
RHINORRHEA: 0
COUGH: 0
ABDOMINAL PAIN: 0
EYE ITCHING: 0
CHEST TIGHTNESS: 0
EYE DISCHARGE: 0
VOMITING: 0
WHEEZING: 0

## 2022-08-11 NOTE — PROGRESS NOTES
Subjective:     Char King is a 23 y.o. female who complains today of:     Chief Complaint   Patient presents with    Follow-up     Discuss sleep study results. HPI    She had PSG done  show AHI 2.9 ,   no O2 desaturation , no snoring   No  daytime sleepiness and tiredness. She does not have good habit of sleep. She stay up at night and sleep during daytime but not all the time. No C/o witness apnea. She does not wakes up with gasping for air. No C/o wakes up frequently during sleep . No complaint of morning headache. She does not take daily naps. She does not fall asleep while watching TV. Allergies:  Patient has no known allergies. History reviewed. No pertinent past medical history. History reviewed. No pertinent surgical history. History reviewed. No pertinent family history. Social History     Socioeconomic History    Marital status: Single     Spouse name: Not on file    Number of children: Not on file    Years of education: Not on file    Highest education level: Not on file   Occupational History    Not on file   Tobacco Use    Smoking status: Never     Passive exposure: Never    Smokeless tobacco: Never   Vaping Use    Vaping Use: Never used   Substance and Sexual Activity    Alcohol use: Never    Drug use: Never    Sexual activity: Never   Other Topics Concern    Not on file   Social History Narrative    Not on file     Social Determinants of Health     Financial Resource Strain: Not on file   Food Insecurity: Not on file   Transportation Needs: Not on file   Physical Activity: Not on file   Stress: Not on file   Social Connections: Not on file   Intimate Partner Violence: Not on file   Housing Stability: Not on file         Review of Systems   Constitutional:  Negative for chills, diaphoresis, fatigue and fever.    HENT:  Negative for congestion, mouth sores, nosebleeds, postnasal drip, rhinorrhea, sinus pressure, sneezing, sore throat, trouble swallowing and voice change. Eyes:  Negative for discharge, itching and visual disturbance. Respiratory:  Negative for cough, chest tightness, shortness of breath and wheezing. Cardiovascular:  Negative for chest pain, palpitations and leg swelling. Gastrointestinal:  Negative for abdominal pain, diarrhea, nausea and vomiting. Genitourinary:  Negative for difficulty urinating and hematuria. Musculoskeletal:  Negative for arthralgias, joint swelling and myalgias. Skin:  Negative for rash. Allergic/Immunologic: Negative for environmental allergies and food allergies. Neurological:  Negative for dizziness, tremors, weakness and headaches. Psychiatric/Behavioral:  Negative for behavioral problems and sleep disturbance.        :     Vitals:    08/11/22 1541   BP: 119/74   Site: Left Upper Arm   Position: Sitting   Cuff Size: Large Adult   Pulse: 64   Temp: 97.6 °F (36.4 °C)   TempSrc: Temporal   SpO2: 100%   Weight: 224 lb (101.6 kg)   Height: 5' 4\" (1.626 m)     Wt Readings from Last 3 Encounters:   08/11/22 224 lb (101.6 kg)   07/25/22 234 lb (106.1 kg)   06/30/22 233 lb 3.2 oz (105.8 kg)         Physical Exam  Constitutional:       General: She is not in acute distress. Appearance: She is well-developed. She is obese. She is not diaphoretic. HENT:      Head: Normocephalic and atraumatic. Nose: Nose normal.   Eyes:      Pupils: Pupils are equal, round, and reactive to light. Neck:      Thyroid: No thyromegaly. Vascular: No JVD. Trachea: No tracheal deviation. Cardiovascular:      Rate and Rhythm: Normal rate and regular rhythm. Heart sounds: No murmur heard. No friction rub. No gallop. Pulmonary:      Effort: No respiratory distress. Breath sounds: No wheezing or rales. Chest:      Chest wall: No tenderness. Abdominal:      General: There is no distension. Tenderness: There is no abdominal tenderness. There is no rebound.    Musculoskeletal:         General: Normal range of motion. Lymphadenopathy:      Cervical: No cervical adenopathy. Skin:     General: Skin is warm and dry. Neurological:      Mental Status: She is alert and oriented to person, place, and time. Coordination: Coordination normal.   Psychiatric:         Mood and Affect: Mood normal.         Behavior: Behavior normal.       Current Outpatient Medications   Medication Sig Dispense Refill    pantoprazole (PROTONIX) 20 MG tablet take 1 tablet by mouth once daily 30 tablet 5    levothyroxine (SYNTHROID) 25 MCG tablet Take 1 tablet by mouth daily 30 tablet 5    polyethylene glycol (GLYCOLAX) 17 GM/SCOOP powder Take 17 g by mouth daily as needed      Psyllium (DAILY FIBER PO) Take 1 capsule by mouth daily      Probiotic Product (PROBIOTIC-10 PO) Take 1 capsule by mouth daily       No current facility-administered medications for this visit. Assessment/Plan:     1. Sleep apnea, unspecified type  She had PSG done  show AHI 2.9 , no O2 desaturation , no snoring   No  daytime sleepiness and tiredness. She does not have good habit of sleep. She stay up at night and sleep during daytime but not all the time. She also does not have a significant symptoms including wake up frequently during sleep or waking up gasping for air. She does not take nap and does not fall asleep watching TV. May consider repeat test if symptoms worsen in future. 2. Obesity (BMI 30-39. 9)  She is advised try to lose weight. obesity related risk explained to the patient ,  Current weight:  224 lb (101.6 kg) Lbs. BMI:  Body mass index is 38.45 kg/m². Suggested weight control approaches, including dietary changes , exercise, behavioral modification. No follow-ups on file.   No f/u needed    Amanda Bran MD

## 2024-10-28 ENCOUNTER — INITIAL PRENATAL (OUTPATIENT)
Dept: OBGYN CLINIC | Age: 24
End: 2024-10-28
Payer: MEDICAID

## 2024-10-28 VITALS
HEART RATE: 80 BPM | BODY MASS INDEX: 36.9 KG/M2 | DIASTOLIC BLOOD PRESSURE: 70 MMHG | SYSTOLIC BLOOD PRESSURE: 114 MMHG | WEIGHT: 215 LBS

## 2024-10-28 DIAGNOSIS — N91.2 AMENORRHEA: ICD-10-CM

## 2024-10-28 DIAGNOSIS — N91.2 AMENORRHEA: Primary | ICD-10-CM

## 2024-10-28 DIAGNOSIS — Z34.02 ENCOUNTER FOR SUPERVISION OF NORMAL FIRST PREGNANCY IN SECOND TRIMESTER: ICD-10-CM

## 2024-10-28 DIAGNOSIS — Z34.82 ENCOUNTER FOR SUPERVISION OF OTHER NORMAL PREGNANCY, SECOND TRIMESTER: ICD-10-CM

## 2024-10-28 LAB
AMPHET UR QL SCN: ABNORMAL
BACTERIA URNS QL MICRO: ABNORMAL /HPF
BARBITURATES UR QL SCN: ABNORMAL
BASOPHILS # BLD: 0 K/UL (ref 0–0.2)
BASOPHILS NFR BLD: 0.4 %
BENZODIAZ UR QL SCN: ABNORMAL
BILIRUB UR QL STRIP: NEGATIVE
CANNABINOIDS UR QL SCN: POSITIVE
CLARITY UR: ABNORMAL
COCAINE UR QL SCN: ABNORMAL
COLOR UR: YELLOW
DRUG SCREEN COMMENT UR-IMP: ABNORMAL
EOSINOPHIL # BLD: 0.1 K/UL (ref 0–0.7)
EOSINOPHIL NFR BLD: 1.1 %
EPI CELLS #/AREA URNS AUTO: ABNORMAL /HPF (ref 0–5)
ERYTHROCYTE [DISTWIDTH] IN BLOOD BY AUTOMATED COUNT: 13.1 % (ref 11.5–14.5)
FENTANYL SCREEN, URINE: ABNORMAL
GLUCOSE UR STRIP-MCNC: NEGATIVE MG/DL
GONADOTROPIN, CHORIONIC (HCG) QUANT: 6355 MIU/ML
HBV SURFACE AG SERPL QL IA: NORMAL
HCT VFR BLD AUTO: 35.1 % (ref 37–47)
HGB BLD-MCNC: 12.7 G/DL (ref 12–16)
HGB UR QL STRIP: NEGATIVE
KETONES UR STRIP-MCNC: NEGATIVE MG/DL
LEUKOCYTE ESTERASE UR QL STRIP: ABNORMAL
LYMPHOCYTES # BLD: 1.8 K/UL (ref 1–4.8)
LYMPHOCYTES NFR BLD: 17.2 %
MCH RBC QN AUTO: 30.5 PG (ref 27–31.3)
MCHC RBC AUTO-ENTMCNC: 36.2 % (ref 33–37)
MCV RBC AUTO: 84.4 FL (ref 79.4–94.8)
METHADONE UR QL SCN: ABNORMAL
MONOCYTES # BLD: 0.4 K/UL (ref 0.2–0.8)
MONOCYTES NFR BLD: 3.8 %
NEUTROPHILS # BLD: 8.1 K/UL (ref 1.4–6.5)
NEUTS SEG NFR BLD: 77 %
NITRITE UR QL STRIP: NEGATIVE
OPIATES UR QL SCN: ABNORMAL
OXYCODONE UR QL SCN: ABNORMAL
PCP UR QL SCN: ABNORMAL
PH UR STRIP: 8.5 [PH] (ref 5–9)
PLATELET # BLD AUTO: 405 K/UL (ref 130–400)
PROPOXYPH UR QL SCN: ABNORMAL
PROT UR STRIP-MCNC: 30 MG/DL
RBC # BLD AUTO: 4.16 M/UL (ref 4.2–5.4)
RBC #/AREA URNS HPF: ABNORMAL /HPF (ref 0–2)
RUBELLA ANTIBODY IGG: 143.8 IU/ML
SP GR UR STRIP: 1.02 (ref 1–1.03)
TSH SERPL-MCNC: 2.63 UIU/ML (ref 0.44–3.86)
UROBILINOGEN UR STRIP-ACNC: 1 E.U./DL
WBC # BLD AUTO: 10.6 K/UL (ref 4.8–10.8)
WBC #/AREA URNS AUTO: ABNORMAL /HPF (ref 0–5)

## 2024-10-28 PROCEDURE — 99203 OFFICE O/P NEW LOW 30 MIN: CPT | Performed by: OBSTETRICS & GYNECOLOGY

## 2024-10-28 RX ORDER — VITAMIN A, VITAMIN C, VITAMIN D-3, VITAMIN E, VITAMIN B-1, VITAMIN B-2, NIACIN, VITAMIN B-6, CALCIUM, IRON, ZINC, COPPER 4000; 120; 400; 22; 1.84; 3; 20; 10; 1; 12; 200; 27; 25; 2 [IU]/1; MG/1; [IU]/1; MG/1; MG/1; MG/1; MG/1; MG/1; MG/1; UG/1; MG/1; MG/1; MG/1; MG/1
1 TABLET ORAL DAILY
Qty: 90 TABLET | Refills: 3 | Status: CANCELLED | OUTPATIENT
Start: 2024-10-28

## 2024-10-28 SDOH — ECONOMIC STABILITY: FOOD INSECURITY: WITHIN THE PAST 12 MONTHS, YOU WORRIED THAT YOUR FOOD WOULD RUN OUT BEFORE YOU GOT MONEY TO BUY MORE.: NEVER TRUE

## 2024-10-28 SDOH — ECONOMIC STABILITY: FOOD INSECURITY: WITHIN THE PAST 12 MONTHS, THE FOOD YOU BOUGHT JUST DIDN'T LAST AND YOU DIDN'T HAVE MONEY TO GET MORE.: NEVER TRUE

## 2024-10-28 SDOH — ECONOMIC STABILITY: INCOME INSECURITY: HOW HARD IS IT FOR YOU TO PAY FOR THE VERY BASICS LIKE FOOD, HOUSING, MEDICAL CARE, AND HEATING?: NOT VERY HARD

## 2024-10-28 ASSESSMENT — PATIENT HEALTH QUESTIONNAIRE - PHQ9
2. FEELING DOWN, DEPRESSED OR HOPELESS: NOT AT ALL
SUM OF ALL RESPONSES TO PHQ QUESTIONS 1-9: 0
SUM OF ALL RESPONSES TO PHQ QUESTIONS 1-9: 0
1. LITTLE INTEREST OR PLEASURE IN DOING THINGS: NOT AT ALL
SUM OF ALL RESPONSES TO PHQ9 QUESTIONS 1 & 2: 0
SUM OF ALL RESPONSES TO PHQ QUESTIONS 1-9: 0
SUM OF ALL RESPONSES TO PHQ QUESTIONS 1-9: 0

## 2024-10-28 NOTE — PROGRESS NOTES
SUBJECTIVE:   24 y.o.  female here for amenorrhea and new ob. Pt denies any VB and is tolerating po daily. Pt taking PNV. Per pt she is about 17wks.     Review of Systems:  General ROS: negative  Psychological ROS: negative  ENT ROS: negative  Endocrine ROS: negative  Respiratory ROS: no cough, shortness of breath, or wheezing  Cardiovascular ROS: no chest pain or dyspnea on exertion  Gastrointestinal ROS: no abdominal pain, change in bowel habits, or black or bloody stools  Genito-Urinary ROS: no dysuria, trouble voiding, or hematuria  Musculoskeletal ROS: negative  Neurological ROS: no TIA or stroke symptoms  Dermatological ROS: negative    OBJECTIVE:   /70   Pulse 80   Wt 97.5 kg (215 lb)   LMP 2024   BMI 36.90 kg/m²     Physical Exam:  GEN: She appears well, afebrile.   HEENT: normal cephalic, atraumatic  CVS: regular rate and rhythm  ABDOMEN: benign, soft, nontender, no masses.  MUSCULOSKELETAL: normal gait, no masses  SKIN: normal texture and tone, no lesions  NEURO: normal tone, no hyperreflexia, 1+DTRs throughout    Pelvic Exam:   EFG: normal external genitalia  URETHRA: normal appearing without diverticula or lesions  VULVA: normal appearing vulva with no masses, tenderness or lesions  VAGINA: normal rugae, no discharge   CERVIX: nulliparous, no lesions  UTERUS: uterus is normal shape, consistency and nontender - 18wks  ADNEXA: normal adnexa in size, nontender and no masses.  PERINEUM: normal appearing without lesions or masses  ANUS: normal appearing without lesions or masses, no fissures or hemorrhoids    ASSESSMENT:   Supervision of pregnancy    PLAN:   Pap with GC/Chlam today  PN labs and US pending   Past medical, social and family history reviewed and updated in pt's chart.

## 2024-10-29 LAB
ABO + RH BLD: NORMAL
BLD GP AB SCN SERPL QL: NORMAL
CLUE CELLS VAG QL WET PREP: NORMAL
ESTIMATED AVERAGE GLUCOSE: 82 MG/DL
FERRITIN: 76 NG/ML (ref 15–150)
HBA1C MFR BLD: 4.5 % (ref 4–6)
HEPATITIS C ANTIBODY: NONREACTIVE
HIV AG/AB: NONREACTIVE
RPR SER QL: NORMAL
T VAGINALIS VAG QL WET PREP: NORMAL
TRICHOMONAS VAGINALIS SCREEN: NEGATIVE
YEAST VAG QL WET PREP: NORMAL

## 2024-10-31 ENCOUNTER — TELEPHONE (OUTPATIENT)
Dept: OBGYN | Age: 24
End: 2024-10-31

## 2024-10-31 LAB
BACTERIA UR CULT: ABNORMAL
BACTERIA UR CULT: ABNORMAL
ORGANISM: ABNORMAL
VZV IGG SER IA-ACNC: 0.4 S/CO

## 2024-10-31 RX ORDER — NITROFURANTOIN 25; 75 MG/1; MG/1
100 CAPSULE ORAL 2 TIMES DAILY
Qty: 20 CAPSULE | Refills: 0 | Status: SHIPPED | OUTPATIENT
Start: 2024-10-31 | End: 2024-11-10

## 2024-11-01 LAB
C TRACH DNA CVX QL NAA+PROBE: NEGATIVE
N GONORRHOEA DNA SPEC QL NAA+PROBE: NEGATIVE

## 2024-11-04 ENCOUNTER — TELEPHONE (OUTPATIENT)
Dept: OBGYN CLINIC | Age: 24
End: 2024-11-04

## 2024-11-04 LAB
Lab: NEGATIVE
Lab: NORMAL
NTRA ALPHA-THALASSEMIA: NEGATIVE
NTRA BETA-HEMOGLOBINOPATHIES: NEGATIVE
NTRA CANAVAN DISEASE: NEGATIVE
NTRA CYSTIC FIBROSIS: NEGATIVE
NTRA DUCHENNE/BECKER MUSCULAR DYSTROPHY: NEGATIVE
NTRA FAMILIAL DYSAUTONOMIA: NEGATIVE
NTRA FRAGILE X SYNDROME: NEGATIVE
NTRA GALACTOSEMIA: NEGATIVE
NTRA GAUCHER DISEASE: NEGATIVE
NTRA MEDIUM CHAIN ACYL-COA DEHYDROGENASE DEFICIENCY: NEGATIVE
NTRA POLYCYSTIC KIDNEY DISEASE, AUTOSOMAL RECESSIVE: NEGATIVE
NTRA SMITH-LEMLI-OPITZ SYNDROME: NEGATIVE
NTRA SPINAL MUSCULAR ATROPHY: NEGATIVE
NTRA TAY-SACHS DISEASE: NEGATIVE

## 2024-11-04 NOTE — TELEPHONE ENCOUNTER
----- Message from Dr. Nany Vizcaino MD sent at 10/31/2024 11:23 AM EDT -----  Rx macrobid sent to pharmacy

## 2024-11-04 NOTE — TELEPHONE ENCOUNTER
LMOM for pt to c/b and be made aware of abnormal urine culture(UTI), and see if she has started her ABX yet.

## 2024-11-05 LAB
Lab: NORMAL
NTRA 22Q11.2 DELETION SYNDROME POPULATION-BASED RISK TEXT: NORMAL
NTRA 22Q11.2 DELETION SYNDROME RESULT TEXT: NORMAL
NTRA 22Q11.2 DELETION SYNDROME RISK SCORE TEXT: NORMAL
NTRA FETAL FRACTION: NORMAL
NTRA FETAL RHD SUMMARY: NORMAL
NTRA GENDER OF FETUS: NORMAL
NTRA MONOSOMY X AGE-BASED RISK TEXT: NORMAL
NTRA MONOSOMY X RESULT TEXT: NORMAL
NTRA MONOSOMY X RISK SCORE TEXT: NORMAL
NTRA TRIPLOIDY RESULT TEXT: NORMAL
NTRA TRISOMY 13 AGE-BASED RISK TEXT: NORMAL
NTRA TRISOMY 13 RESULT TEXT: NORMAL
NTRA TRISOMY 13 RISK SCORE TEXT: NORMAL
NTRA TRISOMY 18 AGE-BASED RISK TEXT: NORMAL
NTRA TRISOMY 18 RESULT TEXT: NORMAL
NTRA TRISOMY 18 RISK SCORE TEXT: NORMAL
NTRA TRISOMY 21 AGE-BASED RISK TEXT: NORMAL
NTRA TRISOMY 21 RESULT TEXT: NORMAL
NTRA TRISOMY 21 RISK SCORE TEXT: NORMAL

## 2024-11-06 NOTE — TELEPHONE ENCOUNTER
Pt called back and made aware of abnormal urine culture.  She did not start the medication yet because she did not know anything was sent.  Pt was made aware that Macrobid was sent to the pharmacy.  She was also requesting gender from her lab work and that was provided to the patient.

## 2024-11-15 LAB
HPV HR 12 DNA SPEC QL NAA+PROBE: NOT DETECTED
HPV16 DNA SPEC QL NAA+PROBE: NOT DETECTED
HPV16+18+H RISK 12 DNA SPEC-IMP: NORMAL
HPV18 DNA SPEC QL NAA+PROBE: NOT DETECTED

## 2024-11-19 ENCOUNTER — TELEPHONE (OUTPATIENT)
Dept: OBGYN CLINIC | Age: 24
End: 2024-11-19

## 2024-11-19 NOTE — TELEPHONE ENCOUNTER
----- Message from Dr. Nany Vizcaino MD sent at 11/18/2024  9:51 AM EST -----  Repeat pap in 3yrs per protocol

## 2024-11-19 NOTE — TELEPHONE ENCOUNTER
LMOM for pt to c/b to be made aware of ASCUS pap results. Per ACOG guidelines, repeat pap in 3 yrs.

## 2024-11-21 ENCOUNTER — ROUTINE PRENATAL (OUTPATIENT)
Dept: OBGYN CLINIC | Age: 24
End: 2024-11-21
Payer: MEDICAID

## 2024-11-21 VITALS
HEART RATE: 113 BPM | SYSTOLIC BLOOD PRESSURE: 116 MMHG | DIASTOLIC BLOOD PRESSURE: 64 MMHG | BODY MASS INDEX: 38.62 KG/M2 | WEIGHT: 225 LBS

## 2024-11-21 DIAGNOSIS — Z34.92 PRENATAL CARE IN SECOND TRIMESTER: Primary | ICD-10-CM

## 2024-11-21 DIAGNOSIS — Z3A.22 22 WEEKS GESTATION OF PREGNANCY: ICD-10-CM

## 2024-11-21 PROCEDURE — 99213 OFFICE O/P EST LOW 20 MIN: CPT | Performed by: OBSTETRICS & GYNECOLOGY

## 2024-11-21 RX ORDER — FAMOTIDINE 20 MG/1
20 TABLET, FILM COATED ORAL 2 TIMES DAILY
Qty: 180 TABLET | Refills: 1 | Status: SHIPPED | OUTPATIENT
Start: 2024-11-21

## 2024-11-21 NOTE — PROGRESS NOTES
Patient's last menstrual period was 06/20/2024.  Please reference prenatal and OB flow chart for further information  PT here today for routine prenatal care  Pt endorses fetal movement and denies loss of fluid, contractions or vaginal bleeding  Pt without complaints  ROS:  Pt denies headache, dysuria, nausea/vomiting  PE:  /64   Pulse (!) 113   Wt 102.1 kg (225 lb)   LMP 06/20/2024   BMI 38.62 kg/m²   Gen - Alert and oriented x 3  HEENT- NC/AT, CVS - RRR, Lungs - CTAB  Abd - FH 22  Appropriate fetal growth  PN labs reviewed - ABO OPos, NIPT normal (XY)  Pap ASCUS HPV neg  Pt pending US for anatomy  Rx Pepcid to pharmacy

## 2024-12-02 ENCOUNTER — HOSPITAL ENCOUNTER (OUTPATIENT)
Dept: ULTRASOUND IMAGING | Age: 24
Discharge: HOME | End: 2024-12-04
Attending: OBSTETRICS & GYNECOLOGY
Payer: MEDICAID

## 2024-12-02 DIAGNOSIS — Z34.92 PRENATAL CARE IN SECOND TRIMESTER: ICD-10-CM

## 2024-12-02 DIAGNOSIS — Z3A.22 22 WEEKS GESTATION OF PREGNANCY: ICD-10-CM

## 2024-12-02 PROCEDURE — 76805 OB US >/= 14 WKS SNGL FETUS: CPT

## 2024-12-20 ENCOUNTER — ROUTINE PRENATAL (OUTPATIENT)
Dept: OBGYN CLINIC | Age: 24
End: 2024-12-20

## 2024-12-20 VITALS
HEART RATE: 94 BPM | WEIGHT: 229 LBS | SYSTOLIC BLOOD PRESSURE: 108 MMHG | DIASTOLIC BLOOD PRESSURE: 66 MMHG | BODY MASS INDEX: 39.31 KG/M2

## 2024-12-20 DIAGNOSIS — E03.9 HYPOTHYROID IN PREGNANCY, ANTEPARTUM, SECOND TRIMESTER: ICD-10-CM

## 2024-12-20 DIAGNOSIS — O21.9 NAUSEA AND VOMITING IN PREGNANCY: ICD-10-CM

## 2024-12-20 DIAGNOSIS — Z34.92 PRENATAL CARE, SECOND TRIMESTER: Primary | ICD-10-CM

## 2024-12-20 DIAGNOSIS — Z3A.26 26 WEEKS GESTATION OF PREGNANCY: ICD-10-CM

## 2024-12-20 DIAGNOSIS — O99.282 HYPOTHYROID IN PREGNANCY, ANTEPARTUM, SECOND TRIMESTER: ICD-10-CM

## 2024-12-20 PROBLEM — G47.30 SLEEP APNEA: Status: RESOLVED | Noted: 2022-07-17 | Resolved: 2024-12-20

## 2024-12-20 RX ORDER — ONDANSETRON 4 MG/1
TABLET, ORALLY DISINTEGRATING ORAL
Qty: 30 TABLET | Refills: 2 | Status: SHIPPED | OUTPATIENT
Start: 2024-12-20 | End: 2025-06-18

## 2024-12-20 RX ORDER — LEVOTHYROXINE SODIUM 25 UG/1
25 TABLET ORAL DAILY
Qty: 90 TABLET | Refills: 3 | Status: SHIPPED | OUTPATIENT
Start: 2024-12-20 | End: 2025-12-20

## 2024-12-20 NOTE — PROGRESS NOTES
SUBJECTIVE:  Denies bleeding, spotting, leaking of fluid, abnormal discharge.  Good fetal movement  Discussed collecting 28 week labs with next visit.  She has been unable to continue her Synthroid, her prescription has .  Still having frequent nausea.    Review of Systems   Eyes:  Negative for visual disturbance.   Respiratory:  Negative for shortness of breath.    Gastrointestinal:  Negative for abdominal pain, constipation, diarrhea, nausea and vomiting.   Genitourinary:  Negative for dysuria, vaginal bleeding and vaginal discharge.   Neurological:  Negative for headaches.     OBJECTIVE:  Physical Exam  Appearance:  Normal appearance  Cardiovascular:  Normal rate, Capillary refill less than 2 seconds  Pulmonary:  Normal effort, no distress  Abdominal:  No tenderness  MS:  No Swelling, No dependent edema  Skin:  Warm, dry  Neuro:  Alert and oriented x3, reflexes normal.  Psychiatric:  Normal mood and behavior    ASSESSMENT:  24 y.o. female  IUP at 26w1d    Patient Active Problem List    Diagnosis Date Noted    Hypothyroid in pregnancy, antepartum, second trimester 2024     10/28/24:  TSH 2.63  24:  Restart Levothyroxine 25 mcg/day  25:  TSH _____, T4 _____        Diagnosis Orders   1. Prenatal care, second trimester  RPR    Glucose tolerance, 1 hour    CBC with Auto Differential      2. 26 weeks gestation of pregnancy        3. Hypothyroid in pregnancy, antepartum, second trimester  levothyroxine (SYNTHROID) 25 MCG tablet    TSH reflex to FT4    T4, Free      4. Nausea and vomiting in pregnancy  ondansetron (ZOFRAN-ODT) 4 MG disintegrating tablet          PLAN:  Hypothyroidism - restart levothyroxine  N/V - stop PNV, start Zofran  28 week labs with next visit  Growth US at 30 weeks    Follow-Up  Return in about 2 weeks (around 1/3/2025) for Prenatal Visit with 1 hour Glucose Tolerance Test.    ARGENTINA Martin - SURINDER

## 2025-01-06 ENCOUNTER — ROUTINE PRENATAL (OUTPATIENT)
Dept: OBGYN CLINIC | Age: 25
End: 2025-01-06
Payer: MEDICAID

## 2025-01-06 VITALS
HEART RATE: 73 BPM | BODY MASS INDEX: 38.62 KG/M2 | WEIGHT: 225 LBS | SYSTOLIC BLOOD PRESSURE: 106 MMHG | DIASTOLIC BLOOD PRESSURE: 68 MMHG

## 2025-01-06 DIAGNOSIS — Z34.93 PRENATAL CARE IN THIRD TRIMESTER: Primary | ICD-10-CM

## 2025-01-06 DIAGNOSIS — Z23 NEED FOR DIPHTHERIA-TETANUS-PERTUSSIS (TDAP) VACCINE: ICD-10-CM

## 2025-01-06 DIAGNOSIS — Z3A.28 28 WEEKS GESTATION OF PREGNANCY: ICD-10-CM

## 2025-01-06 PROCEDURE — 99213 OFFICE O/P EST LOW 20 MIN: CPT | Performed by: OBSTETRICS & GYNECOLOGY

## 2025-01-06 PROCEDURE — 90471 IMMUNIZATION ADMIN: CPT | Performed by: OBSTETRICS & GYNECOLOGY

## 2025-01-06 PROCEDURE — 90715 TDAP VACCINE 7 YRS/> IM: CPT | Performed by: OBSTETRICS & GYNECOLOGY

## 2025-01-06 NOTE — PROGRESS NOTES
Patient's last menstrual period was 06/20/2024.  Please reference prenatal and OB flow chart for further information  PT here today for routine prenatal care  Pt endorses fetal movement and denies loss of fluid, contractions or vaginal bleeding  Pt without complaints  ROS:  Pt denies headache, dysuria, nausea/vomiting  PE:  /68   Pulse 73   Wt 102.1 kg (225 lb)   LMP 06/20/2024   BMI 38.62 kg/m²   Gen - Alert and oriented x 3  HEENT- NC/AT, CVS - RRR, Lungs - CTAB  Abd - FH 28  Appropriate fetal growth  Pt reports episodes of near syncope. Pt has taken her BP and pulse with each episode and they have been in normal ranges.   Pt takes levothyroxine as directed  Tdap today  US for EFW and presentation pending  1hr pending

## 2025-01-10 ENCOUNTER — HOSPITAL ENCOUNTER (OUTPATIENT)
Dept: ULTRASOUND IMAGING | Age: 25
Discharge: HOME | End: 2025-01-12
Attending: OBSTETRICS & GYNECOLOGY
Payer: MEDICAID

## 2025-01-10 DIAGNOSIS — Z3A.28 28 WEEKS GESTATION OF PREGNANCY: ICD-10-CM

## 2025-01-10 DIAGNOSIS — Z34.93 PRENATAL CARE IN THIRD TRIMESTER: ICD-10-CM

## 2025-01-10 PROCEDURE — 76815 OB US LIMITED FETUS(S): CPT

## 2025-01-10 PROCEDURE — 76817 TRANSVAGINAL US OBSTETRIC: CPT

## 2025-01-23 ENCOUNTER — ROUTINE PRENATAL (OUTPATIENT)
Dept: OBGYN CLINIC | Age: 25
End: 2025-01-23
Payer: MEDICAID

## 2025-01-23 VITALS
SYSTOLIC BLOOD PRESSURE: 118 MMHG | WEIGHT: 224 LBS | BODY MASS INDEX: 38.45 KG/M2 | HEART RATE: 68 BPM | DIASTOLIC BLOOD PRESSURE: 72 MMHG

## 2025-01-23 DIAGNOSIS — Z3A.31 31 WEEKS GESTATION OF PREGNANCY: ICD-10-CM

## 2025-01-23 DIAGNOSIS — Z34.93 PRENATAL CARE IN THIRD TRIMESTER: Primary | ICD-10-CM

## 2025-01-23 DIAGNOSIS — O99.282 HYPOTHYROID IN PREGNANCY, ANTEPARTUM, SECOND TRIMESTER: ICD-10-CM

## 2025-01-23 DIAGNOSIS — Z34.92 PRENATAL CARE, SECOND TRIMESTER: ICD-10-CM

## 2025-01-23 DIAGNOSIS — E03.9 HYPOTHYROID IN PREGNANCY, ANTEPARTUM, SECOND TRIMESTER: ICD-10-CM

## 2025-01-23 LAB
BASOPHILS # BLD: 0 K/UL (ref 0–0.2)
BASOPHILS NFR BLD: 0.3 %
EOSINOPHIL # BLD: 0.1 K/UL (ref 0–0.7)
EOSINOPHIL NFR BLD: 0.9 %
ERYTHROCYTE [DISTWIDTH] IN BLOOD BY AUTOMATED COUNT: 13 % (ref 11.5–14.5)
GLUCOSE, 1HR PP: 117 MG/DL (ref 60–140)
HCT VFR BLD AUTO: 33.4 % (ref 37–47)
HGB BLD-MCNC: 11.2 G/DL (ref 12–16)
LYMPHOCYTES # BLD: 1.7 K/UL (ref 1–4.8)
LYMPHOCYTES NFR BLD: 17.3 %
MCH RBC QN AUTO: 29.2 PG (ref 27–31.3)
MCHC RBC AUTO-ENTMCNC: 33.5 % (ref 33–37)
MCV RBC AUTO: 87 FL (ref 79.4–94.8)
MONOCYTES # BLD: 0.4 K/UL (ref 0.2–0.8)
MONOCYTES NFR BLD: 3.8 %
NEUTROPHILS # BLD: 7.5 K/UL (ref 1.4–6.5)
NEUTS SEG NFR BLD: 76.9 %
PLATELET # BLD AUTO: 360 K/UL (ref 130–400)
RBC # BLD AUTO: 3.84 M/UL (ref 4.2–5.4)
REAGIN+T PALLIDUM IGG+IGM SERPL-IMP: NORMAL
T4 FREE SERPL-MCNC: 0.9 NG/DL (ref 0.84–1.68)
TSH REFLEX: 2.62 UIU/ML (ref 0.44–3.86)
WBC # BLD AUTO: 9.8 K/UL (ref 4.8–10.8)

## 2025-01-23 PROCEDURE — 99212 OFFICE O/P EST SF 10 MIN: CPT | Performed by: OBSTETRICS & GYNECOLOGY

## 2025-01-23 SDOH — ECONOMIC STABILITY: FOOD INSECURITY: WITHIN THE PAST 12 MONTHS, THE FOOD YOU BOUGHT JUST DIDN'T LAST AND YOU DIDN'T HAVE MONEY TO GET MORE.: NEVER TRUE

## 2025-01-23 SDOH — ECONOMIC STABILITY: FOOD INSECURITY: WITHIN THE PAST 12 MONTHS, YOU WORRIED THAT YOUR FOOD WOULD RUN OUT BEFORE YOU GOT MONEY TO BUY MORE.: NEVER TRUE

## 2025-01-23 ASSESSMENT — PATIENT HEALTH QUESTIONNAIRE - PHQ9
SUM OF ALL RESPONSES TO PHQ QUESTIONS 1-9: 0
SUM OF ALL RESPONSES TO PHQ9 QUESTIONS 1 & 2: 0
1. LITTLE INTEREST OR PLEASURE IN DOING THINGS: NOT AT ALL
2. FEELING DOWN, DEPRESSED OR HOPELESS: NOT AT ALL

## 2025-01-23 NOTE — PROGRESS NOTES
Patient's last menstrual period was 06/20/2024.  Please reference prenatal and OB flow chart for further information  PT here today for routine prenatal care  Pt endorses fetal movement and denies loss of fluid, contractions or vaginal bleeding  Pt without complaints  ROS:  Pt denies headache, dysuria, nausea/vomiting  PE:  /72   Pulse 68   Wt 101.6 kg (224 lb)   LMP 06/20/2024   BMI 38.45 kg/m²   Gen - Alert and oriented x 3  HEENT- NC/AT, CVS - RRR, Lungs - CTAB  Abd - FH 32  Appropriate fetal growth  US reviewed - trans, NICK 16, EFW 55%  1hr pending

## 2025-02-06 ENCOUNTER — ROUTINE PRENATAL (OUTPATIENT)
Dept: OBGYN CLINIC | Age: 25
End: 2025-02-06
Payer: MEDICAID

## 2025-02-06 VITALS
HEART RATE: 102 BPM | HEIGHT: 64 IN | SYSTOLIC BLOOD PRESSURE: 116 MMHG | DIASTOLIC BLOOD PRESSURE: 72 MMHG | WEIGHT: 224 LBS | BODY MASS INDEX: 38.24 KG/M2

## 2025-02-06 DIAGNOSIS — O32.2XX0 TRANSVERSE LIE OF FETUS, SINGLE OR UNSPECIFIED FETUS: ICD-10-CM

## 2025-02-06 DIAGNOSIS — Z3A.32 32 WEEKS GESTATION OF PREGNANCY: ICD-10-CM

## 2025-02-06 DIAGNOSIS — Z34.93 PRENATAL CARE IN THIRD TRIMESTER: Primary | ICD-10-CM

## 2025-02-06 PROCEDURE — 99212 OFFICE O/P EST SF 10 MIN: CPT | Performed by: OBSTETRICS & GYNECOLOGY

## 2025-02-06 NOTE — PROGRESS NOTES
Patient's last menstrual period was 06/20/2024.  Please reference prenatal and OB flow chart for further information  PT here today for routine prenatal care  Pt endorses fetal movement and denies loss of fluid, contractions or vaginal bleeding  Pt without complaints  ROS:  Pt denies headache, dysuria, nausea/vomiting  PE:  /72   Pulse (!) 102   Ht 1.613 m (5' 3.5\")   Wt 101.6 kg (224 lb)   LMP 06/20/2024   BMI 39.06 kg/m²   Gen - Alert and oriented x 3  HEENT- NC/AT, CVS - RRR, Lungs - CTAB  Abd - FH difficult to assess due to body habitus  Appropriate fetal growth  1hr 117, Hgb 11.2  Pt reports interest in primary elective CD and will discuss at next apt  US pending for presentation (last US transverse)

## 2025-02-24 ENCOUNTER — ROUTINE PRENATAL (OUTPATIENT)
Dept: OBGYN CLINIC | Age: 25
End: 2025-02-24
Payer: MEDICAID

## 2025-02-24 VITALS
SYSTOLIC BLOOD PRESSURE: 112 MMHG | DIASTOLIC BLOOD PRESSURE: 76 MMHG | HEART RATE: 64 BPM | WEIGHT: 222 LBS | BODY MASS INDEX: 38.71 KG/M2

## 2025-02-24 DIAGNOSIS — Z34.93 PRENATAL CARE IN THIRD TRIMESTER: Primary | ICD-10-CM

## 2025-02-24 DIAGNOSIS — Z3A.35 35 WEEKS GESTATION OF PREGNANCY: ICD-10-CM

## 2025-02-24 PROCEDURE — 99212 OFFICE O/P EST SF 10 MIN: CPT | Performed by: OBSTETRICS & GYNECOLOGY

## 2025-02-24 NOTE — PROGRESS NOTES
Patient's last menstrual period was 06/20/2024.  Please reference prenatal and OB flow chart for further information  PT here today for routine prenatal care  Pt endorses fetal movement and denies loss of fluid, contractions or vaginal bleeding  Pt without complaints  ROS:  Pt denies headache, dysuria, nausea/vomiting  PE:  /76   Pulse 64   Wt 100.7 kg (222 lb)   LMP 06/20/2024   BMI 38.71 kg/m²   Gen - Alert and oriented x 3  HEENT- NC/AT, CVS - RRR, Lungs - CTAB  Abd - FH difficult to assess due to body habitus  Appropriate fetal growth  US pending for presentation  GBS at next apt  Pt interested in primary elective CD, will schedule for 3/20 at next apt

## 2025-03-03 ENCOUNTER — PREP FOR PROCEDURE (OUTPATIENT)
Dept: OBGYN | Age: 25
End: 2025-03-03

## 2025-03-03 ENCOUNTER — ROUTINE PRENATAL (OUTPATIENT)
Dept: OBGYN CLINIC | Age: 25
End: 2025-03-03
Payer: MEDICAID

## 2025-03-03 VITALS
SYSTOLIC BLOOD PRESSURE: 124 MMHG | DIASTOLIC BLOOD PRESSURE: 68 MMHG | BODY MASS INDEX: 40.28 KG/M2 | WEIGHT: 231 LBS | HEART RATE: 98 BPM

## 2025-03-03 DIAGNOSIS — Z3A.36 36 WEEKS GESTATION OF PREGNANCY: ICD-10-CM

## 2025-03-03 DIAGNOSIS — Z34.93 PRENATAL CARE IN THIRD TRIMESTER: ICD-10-CM

## 2025-03-03 DIAGNOSIS — Z34.93 PRENATAL CARE IN THIRD TRIMESTER: Primary | ICD-10-CM

## 2025-03-03 PROCEDURE — 99212 OFFICE O/P EST SF 10 MIN: CPT | Performed by: OBSTETRICS & GYNECOLOGY

## 2025-03-03 RX ORDER — SODIUM CHLORIDE 0.9 % (FLUSH) 0.9 %
10 SYRINGE (ML) INJECTION EVERY 12 HOURS SCHEDULED
Status: CANCELLED | OUTPATIENT
Start: 2025-03-03

## 2025-03-03 RX ORDER — SODIUM CHLORIDE 9 MG/ML
INJECTION, SOLUTION INTRAVENOUS PRN
Status: CANCELLED | OUTPATIENT
Start: 2025-03-03

## 2025-03-03 RX ORDER — SODIUM CHLORIDE 0.9 % (FLUSH) 0.9 %
10 SYRINGE (ML) INJECTION PRN
Status: CANCELLED | OUTPATIENT
Start: 2025-03-03

## 2025-03-03 RX ORDER — ONDANSETRON 2 MG/ML
4 INJECTION INTRAMUSCULAR; INTRAVENOUS EVERY 6 HOURS PRN
Status: CANCELLED | OUTPATIENT
Start: 2025-03-03

## 2025-03-03 RX ORDER — SODIUM CHLORIDE, SODIUM LACTATE, POTASSIUM CHLORIDE, CALCIUM CHLORIDE 600; 310; 30; 20 MG/100ML; MG/100ML; MG/100ML; MG/100ML
INJECTION, SOLUTION INTRAVENOUS CONTINUOUS
Status: CANCELLED | OUTPATIENT
Start: 2025-03-03

## 2025-03-03 RX ORDER — SODIUM CHLORIDE, SODIUM LACTATE, POTASSIUM CHLORIDE, AND CALCIUM CHLORIDE .6; .31; .03; .02 G/100ML; G/100ML; G/100ML; G/100ML
1000 INJECTION, SOLUTION INTRAVENOUS ONCE
Status: CANCELLED | OUTPATIENT
Start: 2025-03-03 | End: 2025-03-03

## 2025-03-03 NOTE — PROGRESS NOTES
Patient's last menstrual period was 06/20/2024.  Please reference prenatal and OB flow chart for further information  PT here today for routine prenatal care  Pt endorses fetal movement and denies loss of fluid, contractions or vaginal bleeding  Pt without complaints  ROS:  Pt denies headache, dysuria, nausea/vomiting  PE:  /68   Pulse 98   Wt 104.8 kg (231 lb)   LMP 06/20/2024   BMI 40.28 kg/m²   Gen - Alert and oriented x 3  HEENT- NC/AT, CVS - RRR, Lungs - CTAB  Abd - FH difficult to assess due to body habitus  Appropriate fetal growth  US for presentation pending  GBS pending  Pt for primary elective CD 3/20/25 @ 12:30pm

## 2025-03-07 LAB — GP B STREP SPEC QL CULT: NORMAL

## 2025-03-12 ENCOUNTER — ROUTINE PRENATAL (OUTPATIENT)
Dept: OBGYN CLINIC | Age: 25
End: 2025-03-12
Payer: MEDICAID

## 2025-03-12 VITALS
BODY MASS INDEX: 39.75 KG/M2 | WEIGHT: 228 LBS | HEART RATE: 89 BPM | SYSTOLIC BLOOD PRESSURE: 130 MMHG | DIASTOLIC BLOOD PRESSURE: 82 MMHG

## 2025-03-12 DIAGNOSIS — Z34.93 PRENATAL CARE IN THIRD TRIMESTER: Primary | ICD-10-CM

## 2025-03-12 DIAGNOSIS — Z3A.37 37 WEEKS GESTATION OF PREGNANCY: ICD-10-CM

## 2025-03-12 PROCEDURE — 99212 OFFICE O/P EST SF 10 MIN: CPT | Performed by: OBSTETRICS & GYNECOLOGY

## 2025-03-12 NOTE — PROGRESS NOTES
Patient's last menstrual period was 06/20/2024.  Please reference prenatal and OB flow chart for further information  PT here today for routine prenatal care  Pt endorses fetal movement and denies loss of fluid, contractions or vaginal bleeding  Pt without complaints  ROS:  Pt denies headache, dysuria, nausea/vomiting  PE:  /82   Pulse 89   Wt 103.4 kg (228 lb)   LMP 06/20/2024   BMI 39.75 kg/m²   Gen - Alert and oriented x 3  HEENT- NC/AT, CVS - RRR, Lungs - CTAB  Abd - FH 38  Appropriate fetal growth  US for presentation pending  GBS negative  Pt for primary elective CD 3/20/25 @ 12:30pm

## 2025-03-19 ENCOUNTER — HOSPITAL ENCOUNTER (OUTPATIENT)
Dept: ULTRASOUND IMAGING | Age: 25
Discharge: HOME | End: 2025-03-21
Payer: MEDICAID

## 2025-03-19 ENCOUNTER — TELEPHONE (OUTPATIENT)
Dept: OBGYN CLINIC | Age: 25
End: 2025-03-19

## 2025-03-19 DIAGNOSIS — Z34.93 PRENATAL CARE IN THIRD TRIMESTER: ICD-10-CM

## 2025-03-19 DIAGNOSIS — Z3A.32 32 WEEKS GESTATION OF PREGNANCY: ICD-10-CM

## 2025-03-19 DIAGNOSIS — O32.2XX0 TRANSVERSE LIE OF FETUS, SINGLE OR UNSPECIFIED FETUS: ICD-10-CM

## 2025-03-19 PROCEDURE — 76815 OB US LIMITED FETUS(S): CPT

## 2025-03-19 NOTE — FLOWSHEET NOTE
Patient was called at 1940 to confirm her scheduled  section for tomorrow at 1230pm. Advised patient to arrive at 9am and nothing to eat or drink after midnight. Also informed patient that she will be tested for flu and covid before section. If flu positive the CD will be done, if positive for covid the CD will be rescheduled.

## 2025-03-19 NOTE — TELEPHONE ENCOUNTER
darryl frausto is scheduled for csection tomorrow.  she said she is sick now and was exposed to covid three weeks ago, she has a sore throat, cough, and nasal congestion.  Nurse was contacted and pt made aware nurse did not think the exposure is related to covid three weeks ago.  She was told to keep her appointment for tomorrow's csection, stay well-hydrated and well-rested.  If she starts with a fever and/or vomiting to call labor and delivery and pt was given the number to the department.

## 2025-03-20 ENCOUNTER — HOSPITAL ENCOUNTER (OUTPATIENT)
Age: 25
Setting detail: SURGERY ADMIT
Discharge: HOME | End: 2025-03-20
Attending: OBSTETRICS & GYNECOLOGY | Admitting: OBSTETRICS & GYNECOLOGY
Payer: MEDICAID

## 2025-03-20 VITALS — HEIGHT: 64 IN | WEIGHT: 229.6 LBS | BODY MASS INDEX: 39.2 KG/M2

## 2025-03-20 LAB
AMPHET UR QL SCN: ABNORMAL
BACTERIA URNS QL MICRO: ABNORMAL /HPF
BARBITURATES UR QL SCN: ABNORMAL
BENZODIAZ UR QL SCN: ABNORMAL
BILIRUB UR QL STRIP: NEGATIVE
CANNABINOIDS UR QL SCN: POSITIVE
CLARITY UR: ABNORMAL
COCAINE UR QL SCN: ABNORMAL
COLOR UR: YELLOW
DRUG SCREEN COMMENT UR-IMP: ABNORMAL
EPI CELLS #/AREA URNS AUTO: ABNORMAL /HPF (ref 0–5)
FENTANYL SCREEN, URINE: ABNORMAL
GLUCOSE UR STRIP-MCNC: NEGATIVE MG/DL
HGB UR QL STRIP: NEGATIVE
HYALINE CASTS #/AREA URNS LPF: ABNORMAL /LPF (ref 0–5)
INFLUENZA A BY PCR: NEGATIVE
INFLUENZA B BY PCR: NEGATIVE
KETONES UR STRIP-MCNC: ABNORMAL MG/DL
LEUKOCYTE ESTERASE UR QL STRIP: ABNORMAL
METHADONE UR QL SCN: ABNORMAL
NITRITE UR QL STRIP: NEGATIVE
OPIATES UR QL SCN: ABNORMAL
OXYCODONE UR QL SCN: ABNORMAL
PCP UR QL SCN: ABNORMAL
PH UR STRIP: 6.5 [PH] (ref 5–9)
PROPOXYPH UR QL SCN: ABNORMAL
PROT UR STRIP-MCNC: ABNORMAL MG/DL
RBC #/AREA URNS HPF: ABNORMAL /HPF (ref 0–2)
SARS-COV-2 RDRP RESP QL NAA+PROBE: DETECTED
SP GR UR STRIP: 1.02 (ref 1–1.03)
URINE REFLEX TO CULTURE: YES
UROBILINOGEN UR STRIP-ACNC: 0.2 E.U./DL
WBC #/AREA URNS AUTO: ABNORMAL /HPF (ref 0–5)

## 2025-03-20 PROCEDURE — 80307 DRUG TEST PRSMV CHEM ANLYZR: CPT

## 2025-03-20 PROCEDURE — 99212 OFFICE O/P EST SF 10 MIN: CPT | Performed by: OBSTETRICS & GYNECOLOGY

## 2025-03-20 PROCEDURE — 87635 SARS-COV-2 COVID-19 AMP PRB: CPT

## 2025-03-20 PROCEDURE — 87086 URINE CULTURE/COLONY COUNT: CPT

## 2025-03-20 PROCEDURE — 81001 URINALYSIS AUTO W/SCOPE: CPT

## 2025-03-20 PROCEDURE — 87502 INFLUENZA DNA AMP PROBE: CPT

## 2025-03-20 NOTE — PROGRESS NOTES
Spoke with patient in regards to rescheduling for section on Thursday 3/27/2025 at 0800. Educated patient on being under precautions for still being in the 10 day period of isolation for Covid and being allowed one visitor who will remain in room during stay. Patient verbalized understanding.

## 2025-03-20 NOTE — PROGRESS NOTES
Dr. Iniguez bedside speaking with patient in regards to positive Covid results. Patient will need rescheduled at this time for her . Patient verbalized understanding and denied any further questions.

## 2025-03-20 NOTE — DISCHARGE SUMMARY
Discharge Summary    Ela Briggs  :  2000  MRN:  [id number]    ADMIT DATE:  3/20/2025  DISCHARGE DATE:  3/20/2025    PRIMARY CARE PHYSICIAN:  James Ritchie DO    VISIT STATUS: Evaluation    CODE STATUS:  No Order    DISCHARGE DIAGNOSES:  Active Problems:    * No active hospital problems. *  Resolved Problems:    * No resolved hospital problems. *      HOSPITAL COURSE:  Patient is a 25 yo  female @ 39 weeks with EDC 3/27/2025 s/b LMP 2024 c/b 23.1 weeks sono. She presented with complaints of congestion, cough and runny nose. She reports recent contact with COVID. Testing was performed and active COVID infection was confirmed. Patient was given reassurance. She was discharged home  with labor precautions and plan to return in 1 week for scheduled elective primary LTCS. Plan was reviewed with OB provider who directed plan of care.   RECOMMENDED NEXT STEPS:   Discharge Home     DISCHARGE MEDICATIONS:         Medication List        ASK your doctor about these medications      DAILY FIBER PO     famotidine 20 MG tablet  Commonly known as: PEPCID  Take 1 tablet by mouth 2 times daily     levothyroxine 25 MCG tablet  Commonly known as: SYNTHROID  Take 1 tablet by mouth daily     ondansetron 4 MG disintegrating tablet  Commonly known as: ZOFRAN-ODT  Let 1 tablet dissolve on your tongue every 4 hours as needed to relieve nausea/vomiting.     PRENATAL/FOLIC ACID+DHA PO     PROBIOTIC-10 PO              DIET: Regular     ACTIVITY: No restriction. Isolate for 5 days secondary to COVID   _________________________________________________________  COMPLEXITY OF FOLLOW UP:   [] Moderate Complexity: follow up within 7-14 calendar days (48499)   [x] Severe Complexity: follow up within 7 calendar days (10143)          DISPOSITION: Home        Follow up with Dr. Vizcaino in 1 week    INSTRUCTIONS TO MA/SW: Please call patient on day after discharge (must document patient  contacted within 2 business days of

## 2025-03-20 NOTE — PROGRESS NOTES
Message left for Dr. Vizcaino in regards to needing to reschedule patient due to positive Covid results.

## 2025-03-21 LAB — BACTERIA UR CULT: NORMAL

## 2025-03-26 ENCOUNTER — ROUTINE PRENATAL (OUTPATIENT)
Dept: OBGYN CLINIC | Age: 25
End: 2025-03-26
Payer: MEDICAID

## 2025-03-26 VITALS
WEIGHT: 232 LBS | HEART RATE: 88 BPM | DIASTOLIC BLOOD PRESSURE: 82 MMHG | BODY MASS INDEX: 40.45 KG/M2 | SYSTOLIC BLOOD PRESSURE: 122 MMHG

## 2025-03-26 DIAGNOSIS — Z3A.39 39 WEEKS GESTATION OF PREGNANCY: ICD-10-CM

## 2025-03-26 DIAGNOSIS — Z34.93 PRENATAL CARE IN THIRD TRIMESTER: Primary | ICD-10-CM

## 2025-03-26 PROCEDURE — 99212 OFFICE O/P EST SF 10 MIN: CPT | Performed by: OBSTETRICS & GYNECOLOGY

## 2025-03-26 NOTE — FLOWSHEET NOTE
Call placed to patient re: tomorrows scheduled procedure. No answer message left with unit phone to return call.

## 2025-03-26 NOTE — PROGRESS NOTES
Patient's last menstrual period was 06/20/2024.  Please reference prenatal and OB flow chart for further information  PT here today for routine prenatal care  Pt endorses fetal movement and denies loss of fluid, contractions or vaginal bleeding  Pt without complaints  ROS:  Pt denies headache, dysuria, nausea/vomiting  PE:  /82   Pulse 88   Wt 105.2 kg (232 lb)   LMP 06/20/2024   BMI 40.45 kg/m²   Gen - Alert and oriented x 3  HEENT- NC/AT, CVS - RRR, Lungs - CTAB  Abd - FH difficult to assess due to body habitus  Appropriate fetal growth  Pt was planned for primary CD last week but pt tested positive for COVID.   Pt states she is feeling much better today  Pt planned for elective CD 3/27/25 @ 8am.

## 2025-03-27 ENCOUNTER — HOSPITAL ENCOUNTER (INPATIENT)
Age: 25
LOS: 2 days | Discharge: HOME | End: 2025-03-29
Attending: OBSTETRICS & GYNECOLOGY | Admitting: OBSTETRICS & GYNECOLOGY
Payer: MEDICAID

## 2025-03-27 ENCOUNTER — ANESTHESIA EVENT (OUTPATIENT)
Dept: LABOR AND DELIVERY | Age: 25
End: 2025-03-27
Payer: MEDICAID

## 2025-03-27 ENCOUNTER — ANESTHESIA (OUTPATIENT)
Dept: LABOR AND DELIVERY | Age: 25
End: 2025-03-27
Payer: MEDICAID

## 2025-03-27 DIAGNOSIS — Z98.891 S/P C-SECTION: Primary | ICD-10-CM

## 2025-03-27 PROBLEM — O99.210 OBESITY DURING PREGNANCY: Status: ACTIVE | Noted: 2025-03-27

## 2025-03-27 PROBLEM — Z34.90 TERM PREGNANCY: Status: ACTIVE | Noted: 2025-03-27

## 2025-03-27 LAB
ABO/RH: NORMAL
ALBUMIN SERPL-MCNC: 3.4 G/DL (ref 3.5–4.6)
ALP SERPL-CCNC: 129 U/L (ref 40–130)
ALT SERPL-CCNC: 13 U/L (ref 0–33)
AMPHET UR QL SCN: ABNORMAL
ANION GAP SERPL CALCULATED.3IONS-SCNC: 10 MEQ/L (ref 9–15)
ANTIBODY SCREEN: NORMAL
AST SERPL-CCNC: 13 U/L (ref 0–35)
BACTERIA URNS QL MICRO: ABNORMAL /HPF
BARBITURATES UR QL SCN: ABNORMAL
BASOPHILS # BLD: 0 K/UL (ref 0–0.2)
BASOPHILS NFR BLD: 0.2 %
BENZODIAZ UR QL SCN: ABNORMAL
BILIRUB SERPL-MCNC: <0.2 MG/DL (ref 0.2–0.7)
BILIRUB UR QL STRIP: NEGATIVE
BUN SERPL-MCNC: 8 MG/DL (ref 6–20)
CALCIUM SERPL-MCNC: 9 MG/DL (ref 8.5–9.9)
CANNABINOIDS UR QL SCN: POSITIVE
CHLORIDE SERPL-SCNC: 105 MEQ/L (ref 95–107)
CLARITY UR: ABNORMAL
CO2 SERPL-SCNC: 21 MEQ/L (ref 20–31)
COCAINE UR QL SCN: ABNORMAL
COLOR UR: YELLOW
CREAT SERPL-MCNC: 0.54 MG/DL (ref 0.5–0.9)
DRUG SCREEN COMMENT UR-IMP: ABNORMAL
EOSINOPHIL # BLD: 0.1 K/UL (ref 0–0.7)
EOSINOPHIL NFR BLD: 0.9 %
EPI CELLS #/AREA URNS AUTO: ABNORMAL /HPF (ref 0–5)
ERYTHROCYTE [DISTWIDTH] IN BLOOD BY AUTOMATED COUNT: 13.3 % (ref 11.5–14.5)
FENTANYL SCREEN, URINE: ABNORMAL
GLOBULIN SER CALC-MCNC: 3.2 G/DL (ref 2.3–3.5)
GLUCOSE SERPL-MCNC: 92 MG/DL (ref 70–99)
GLUCOSE UR STRIP-MCNC: NEGATIVE MG/DL
HBV SURFACE AG SERPL QL IA: NORMAL
HCT VFR BLD AUTO: 33.3 % (ref 37–47)
HGB BLD-MCNC: 11.5 G/DL (ref 12–16)
HGB UR QL STRIP: NEGATIVE
HIV AG/AB: NONREACTIVE
HYALINE CASTS #/AREA URNS AUTO: ABNORMAL /HPF (ref 0–5)
HYALINE CASTS #/AREA URNS LPF: ABNORMAL /LPF (ref 0–5)
KETONES UR STRIP-MCNC: ABNORMAL MG/DL
LEUKOCYTE ESTERASE UR QL STRIP: ABNORMAL
LYMPHOCYTES # BLD: 2.5 K/UL (ref 1–4.8)
LYMPHOCYTES NFR BLD: 19.6 %
MCH RBC QN AUTO: 29.9 PG (ref 27–31.3)
MCHC RBC AUTO-ENTMCNC: 34.5 % (ref 33–37)
MCV RBC AUTO: 86.5 FL (ref 79.4–94.8)
METHADONE UR QL SCN: ABNORMAL
MONOCYTES # BLD: 0.6 K/UL (ref 0.2–0.8)
MONOCYTES NFR BLD: 4.4 %
NEUTROPHILS # BLD: 9.5 K/UL (ref 1.4–6.5)
NEUTS SEG NFR BLD: 74.2 %
NITRITE UR QL STRIP: NEGATIVE
OPIATES UR QL SCN: ABNORMAL
OXYCODONE UR QL SCN: ABNORMAL
PCP UR QL SCN: ABNORMAL
PH UR STRIP: 6.5 [PH] (ref 5–9)
PLATELET # BLD AUTO: 346 K/UL (ref 130–400)
POTASSIUM SERPL-SCNC: 3.9 MEQ/L (ref 3.4–4.9)
PROPOXYPH UR QL SCN: ABNORMAL
PROT SERPL-MCNC: 6.6 G/DL (ref 6.3–8)
PROT UR STRIP-MCNC: NEGATIVE MG/DL
RBC # BLD AUTO: 3.85 M/UL (ref 4.2–5.4)
RBC #/AREA URNS HPF: ABNORMAL /HPF (ref 0–2)
SODIUM SERPL-SCNC: 136 MEQ/L (ref 135–144)
SP GR UR STRIP: 1.02 (ref 1–1.03)
UROBILINOGEN UR STRIP-ACNC: 0.2 E.U./DL
WBC # BLD AUTO: 12.8 K/UL (ref 4.8–10.8)
WBC #/AREA URNS AUTO: ABNORMAL /HPF (ref 0–5)

## 2025-03-27 PROCEDURE — 2709999900 HC NON-CHARGEABLE SUPPLY: Performed by: OBSTETRICS & GYNECOLOGY

## 2025-03-27 PROCEDURE — 7100000001 HC PACU RECOVERY - ADDTL 15 MIN: Performed by: OBSTETRICS & GYNECOLOGY

## 2025-03-27 PROCEDURE — 6360000002 HC RX W HCPCS: Performed by: OBSTETRICS & GYNECOLOGY

## 2025-03-27 PROCEDURE — 2500000003 HC RX 250 WO HCPCS: Performed by: OBSTETRICS & GYNECOLOGY

## 2025-03-27 PROCEDURE — 86900 BLOOD TYPING SEROLOGIC ABO: CPT

## 2025-03-27 PROCEDURE — 87389 HIV-1 AG W/HIV-1&-2 AB AG IA: CPT

## 2025-03-27 PROCEDURE — 86850 RBC ANTIBODY SCREEN: CPT

## 2025-03-27 PROCEDURE — 3700000000 HC ANESTHESIA ATTENDED CARE: Performed by: OBSTETRICS & GYNECOLOGY

## 2025-03-27 PROCEDURE — 3700000001 HC ADD 15 MINUTES (ANESTHESIA): Performed by: OBSTETRICS & GYNECOLOGY

## 2025-03-27 PROCEDURE — 87340 HEPATITIS B SURFACE AG IA: CPT

## 2025-03-27 PROCEDURE — 3609079900 HC CESAREAN SECTION: Performed by: OBSTETRICS & GYNECOLOGY

## 2025-03-27 PROCEDURE — 80307 DRUG TEST PRSMV CHEM ANLYZR: CPT

## 2025-03-27 PROCEDURE — 7100000000 HC PACU RECOVERY - FIRST 15 MIN: Performed by: OBSTETRICS & GYNECOLOGY

## 2025-03-27 PROCEDURE — 1220000000 HC SEMI PRIVATE OB R&B

## 2025-03-27 PROCEDURE — 6370000000 HC RX 637 (ALT 250 FOR IP): Performed by: OBSTETRICS & GYNECOLOGY

## 2025-03-27 PROCEDURE — 86901 BLOOD TYPING SEROLOGIC RH(D): CPT

## 2025-03-27 PROCEDURE — 59514 CESAREAN DELIVERY ONLY: CPT | Performed by: OBSTETRICS & GYNECOLOGY

## 2025-03-27 PROCEDURE — 59025 FETAL NON-STRESS TEST: CPT | Performed by: OBSTETRICS & GYNECOLOGY

## 2025-03-27 PROCEDURE — 80053 COMPREHEN METABOLIC PANEL: CPT

## 2025-03-27 PROCEDURE — 6360000002 HC RX W HCPCS: Performed by: NURSE ANESTHETIST, CERTIFIED REGISTERED

## 2025-03-27 PROCEDURE — 81001 URINALYSIS AUTO W/SCOPE: CPT

## 2025-03-27 PROCEDURE — 2580000003 HC RX 258: Performed by: NURSE ANESTHETIST, CERTIFIED REGISTERED

## 2025-03-27 PROCEDURE — 2580000003 HC RX 258: Performed by: OBSTETRICS & GYNECOLOGY

## 2025-03-27 PROCEDURE — 85025 COMPLETE CBC W/AUTO DIFF WBC: CPT

## 2025-03-27 RX ORDER — SODIUM CHLORIDE, SODIUM LACTATE, POTASSIUM CHLORIDE, CALCIUM CHLORIDE 600; 310; 30; 20 MG/100ML; MG/100ML; MG/100ML; MG/100ML
INJECTION, SOLUTION INTRAVENOUS CONTINUOUS
Status: DISCONTINUED | OUTPATIENT
Start: 2025-03-27 | End: 2025-03-27

## 2025-03-27 RX ORDER — OXYCODONE HYDROCHLORIDE 5 MG/1
5 TABLET ORAL EVERY 4 HOURS PRN
Status: DISCONTINUED | OUTPATIENT
Start: 2025-03-27 | End: 2025-03-29 | Stop reason: HOSPADM

## 2025-03-27 RX ORDER — DIPHENHYDRAMINE HYDROCHLORIDE 50 MG/ML
25 INJECTION, SOLUTION INTRAMUSCULAR; INTRAVENOUS EVERY 6 HOURS PRN
Status: DISCONTINUED | OUTPATIENT
Start: 2025-03-27 | End: 2025-03-29 | Stop reason: HOSPADM

## 2025-03-27 RX ORDER — SCOPOLAMINE 1 MG/3D
1 PATCH, EXTENDED RELEASE TRANSDERMAL ONCE
Status: DISCONTINUED | OUTPATIENT
Start: 2025-03-27 | End: 2025-03-29 | Stop reason: HOSPADM

## 2025-03-27 RX ORDER — FENTANYL CITRATE 50 UG/ML
INJECTION, SOLUTION INTRAMUSCULAR; INTRAVENOUS
Status: COMPLETED | OUTPATIENT
Start: 2025-03-27 | End: 2025-03-27

## 2025-03-27 RX ORDER — SODIUM CHLORIDE 0.9 % (FLUSH) 0.9 %
5-40 SYRINGE (ML) INJECTION PRN
Status: DISCONTINUED | OUTPATIENT
Start: 2025-03-27 | End: 2025-03-29 | Stop reason: HOSPADM

## 2025-03-27 RX ORDER — ACETAMINOPHEN 500 MG
1000 TABLET ORAL EVERY 8 HOURS PRN
Status: DISCONTINUED | OUTPATIENT
Start: 2025-03-27 | End: 2025-03-29 | Stop reason: HOSPADM

## 2025-03-27 RX ORDER — OXYCODONE HYDROCHLORIDE 5 MG/1
10 TABLET ORAL EVERY 4 HOURS PRN
Status: DISCONTINUED | OUTPATIENT
Start: 2025-03-27 | End: 2025-03-29 | Stop reason: HOSPADM

## 2025-03-27 RX ORDER — FAMOTIDINE 20 MG/1
20 TABLET, FILM COATED ORAL 2 TIMES DAILY
Status: DISCONTINUED | OUTPATIENT
Start: 2025-03-27 | End: 2025-03-29 | Stop reason: HOSPADM

## 2025-03-27 RX ORDER — SODIUM CHLORIDE 9 MG/ML
INJECTION, SOLUTION INTRAVENOUS PRN
Status: DISCONTINUED | OUTPATIENT
Start: 2025-03-27 | End: 2025-03-27

## 2025-03-27 RX ORDER — SODIUM CHLORIDE 0.9 % (FLUSH) 0.9 %
5-40 SYRINGE (ML) INJECTION EVERY 12 HOURS SCHEDULED
Status: DISCONTINUED | OUTPATIENT
Start: 2025-03-27 | End: 2025-03-29 | Stop reason: HOSPADM

## 2025-03-27 RX ORDER — SODIUM CHLORIDE 9 MG/ML
INJECTION, SOLUTION INTRAVENOUS PRN
Status: DISCONTINUED | OUTPATIENT
Start: 2025-03-27 | End: 2025-03-29 | Stop reason: HOSPADM

## 2025-03-27 RX ORDER — KETOROLAC TROMETHAMINE 30 MG/ML
30 INJECTION, SOLUTION INTRAMUSCULAR; INTRAVENOUS EVERY 6 HOURS
Status: COMPLETED | OUTPATIENT
Start: 2025-03-27 | End: 2025-03-28

## 2025-03-27 RX ORDER — PRENATAL VIT/IRON FUM/FOLIC AC 27MG-0.8MG
1 TABLET ORAL DAILY
Status: DISCONTINUED | OUTPATIENT
Start: 2025-03-27 | End: 2025-03-29 | Stop reason: HOSPADM

## 2025-03-27 RX ORDER — DOCUSATE SODIUM 100 MG/1
100 CAPSULE, LIQUID FILLED ORAL DAILY
Status: DISCONTINUED | OUTPATIENT
Start: 2025-03-27 | End: 2025-03-29 | Stop reason: HOSPADM

## 2025-03-27 RX ORDER — SODIUM CHLORIDE, SODIUM LACTATE, POTASSIUM CHLORIDE, AND CALCIUM CHLORIDE .6; .31; .03; .02 G/100ML; G/100ML; G/100ML; G/100ML
1000 INJECTION, SOLUTION INTRAVENOUS ONCE
Status: COMPLETED | OUTPATIENT
Start: 2025-03-27 | End: 2025-03-27

## 2025-03-27 RX ORDER — SODIUM CHLORIDE, SODIUM LACTATE, POTASSIUM CHLORIDE, CALCIUM CHLORIDE 600; 310; 30; 20 MG/100ML; MG/100ML; MG/100ML; MG/100ML
INJECTION, SOLUTION INTRAVENOUS CONTINUOUS
Status: DISCONTINUED | OUTPATIENT
Start: 2025-03-27 | End: 2025-03-29 | Stop reason: HOSPADM

## 2025-03-27 RX ORDER — ONDANSETRON 4 MG/1
4 TABLET, ORALLY DISINTEGRATING ORAL EVERY 8 HOURS PRN
Status: DISCONTINUED | OUTPATIENT
Start: 2025-03-27 | End: 2025-03-29 | Stop reason: HOSPADM

## 2025-03-27 RX ORDER — SODIUM CHLORIDE 0.9 % (FLUSH) 0.9 %
10 SYRINGE (ML) INJECTION EVERY 12 HOURS SCHEDULED
Status: DISCONTINUED | OUTPATIENT
Start: 2025-03-27 | End: 2025-03-27

## 2025-03-27 RX ORDER — SODIUM CHLORIDE 0.9 % (FLUSH) 0.9 %
10 SYRINGE (ML) INJECTION PRN
Status: DISCONTINUED | OUTPATIENT
Start: 2025-03-27 | End: 2025-03-27

## 2025-03-27 RX ORDER — LEVOTHYROXINE SODIUM 25 UG/1
25 TABLET ORAL DAILY
Status: DISCONTINUED | OUTPATIENT
Start: 2025-03-27 | End: 2025-03-29 | Stop reason: HOSPADM

## 2025-03-27 RX ORDER — IBUPROFEN 800 MG/1
800 TABLET, FILM COATED ORAL EVERY 8 HOURS
Status: DISCONTINUED | OUTPATIENT
Start: 2025-03-28 | End: 2025-03-29 | Stop reason: HOSPADM

## 2025-03-27 RX ORDER — ONDANSETRON 2 MG/ML
4 INJECTION INTRAMUSCULAR; INTRAVENOUS EVERY 6 HOURS PRN
Status: DISCONTINUED | OUTPATIENT
Start: 2025-03-27 | End: 2025-03-27

## 2025-03-27 RX ORDER — MODIFIED LANOLIN
OINTMENT (GRAM) TOPICAL
Status: DISCONTINUED | OUTPATIENT
Start: 2025-03-27 | End: 2025-03-29 | Stop reason: HOSPADM

## 2025-03-27 RX ORDER — ONDANSETRON 2 MG/ML
4 INJECTION INTRAMUSCULAR; INTRAVENOUS EVERY 6 HOURS PRN
Status: DISCONTINUED | OUTPATIENT
Start: 2025-03-27 | End: 2025-03-29 | Stop reason: HOSPADM

## 2025-03-27 RX ORDER — BUPIVACAINE HYDROCHLORIDE 7.5 MG/ML
INJECTION, SOLUTION EPIDURAL; RETROBULBAR
Status: COMPLETED | OUTPATIENT
Start: 2025-03-27 | End: 2025-03-27

## 2025-03-27 RX ORDER — SODIUM CHLORIDE, SODIUM LACTATE, POTASSIUM CHLORIDE, CALCIUM CHLORIDE 600; 310; 30; 20 MG/100ML; MG/100ML; MG/100ML; MG/100ML
INJECTION, SOLUTION INTRAVENOUS
Status: DISCONTINUED | OUTPATIENT
Start: 2025-03-27 | End: 2025-03-27 | Stop reason: SDUPTHER

## 2025-03-27 RX ADMIN — Medication 0.1 MG: at 08:25

## 2025-03-27 RX ADMIN — Medication 167 ML: at 08:35

## 2025-03-27 RX ADMIN — KETOROLAC TROMETHAMINE 30 MG: 30 INJECTION, SOLUTION INTRAMUSCULAR at 16:30

## 2025-03-27 RX ADMIN — KETOROLAC TROMETHAMINE 30 MG: 30 INJECTION, SOLUTION INTRAMUSCULAR at 21:44

## 2025-03-27 RX ADMIN — OXYCODONE 10 MG: 5 TABLET ORAL at 11:38

## 2025-03-27 RX ADMIN — LEVOTHYROXINE SODIUM 25 MCG: 0.03 TABLET ORAL at 10:27

## 2025-03-27 RX ADMIN — OXYCODONE 10 MG: 5 TABLET ORAL at 16:31

## 2025-03-27 RX ADMIN — FAMOTIDINE 20 MG: 20 TABLET, FILM COATED ORAL at 21:45

## 2025-03-27 RX ADMIN — SODIUM CHLORIDE, SODIUM LACTATE, POTASSIUM CHLORIDE, AND CALCIUM CHLORIDE 1000 ML: .6; .31; .03; .02 INJECTION, SOLUTION INTRAVENOUS at 06:52

## 2025-03-27 RX ADMIN — Medication 0.1 MG: at 08:37

## 2025-03-27 RX ADMIN — Medication: at 10:08

## 2025-03-27 RX ADMIN — ONDANSETRON 4 MG: 2 INJECTION INTRAMUSCULAR; INTRAVENOUS at 08:40

## 2025-03-27 RX ADMIN — SODIUM CHLORIDE, PRESERVATIVE FREE 10 ML: 5 INJECTION INTRAVENOUS at 12:45

## 2025-03-27 RX ADMIN — SODIUM CHLORIDE, PRESERVATIVE FREE 20 MG: 5 INJECTION INTRAVENOUS at 06:56

## 2025-03-27 RX ADMIN — KETOROLAC TROMETHAMINE 30 MG: 30 INJECTION, SOLUTION INTRAMUSCULAR at 10:08

## 2025-03-27 RX ADMIN — WATER 3000 MG: 1 INJECTION INTRAMUSCULAR; INTRAVENOUS; SUBCUTANEOUS at 08:23

## 2025-03-27 RX ADMIN — Medication 0.1 MG: at 08:31

## 2025-03-27 RX ADMIN — BUPIVACAINE HYDROCHLORIDE 12 MG: 7.5 INJECTION, SOLUTION EPIDURAL; RETROBULBAR at 08:22

## 2025-03-27 RX ADMIN — Medication 87.3 ML/HR: at 08:47

## 2025-03-27 RX ADMIN — ACETAMINOPHEN 1000 MG: 500 TABLET ORAL at 11:01

## 2025-03-27 RX ADMIN — FENTANYL CITRATE 10 MCG: 50 INJECTION, SOLUTION INTRAMUSCULAR; INTRAVENOUS at 08:22

## 2025-03-27 RX ADMIN — HYDROMORPHONE HYDROCHLORIDE 0.25 MG: 1 INJECTION, SOLUTION INTRAMUSCULAR; INTRAVENOUS; SUBCUTANEOUS at 12:45

## 2025-03-27 RX ADMIN — SODIUM CHLORIDE, SODIUM LACTATE, POTASSIUM CHLORIDE, AND CALCIUM CHLORIDE: .6; .31; .03; .02 INJECTION, SOLUTION INTRAVENOUS at 08:16

## 2025-03-27 ASSESSMENT — PAIN SCALES - GENERAL
PAINLEVEL_OUTOF10: 0
PAINLEVEL_OUTOF10: 0
PAINLEVEL_OUTOF10: 9
PAINLEVEL_OUTOF10: 0
PAINLEVEL_OUTOF10: 6
PAINLEVEL_OUTOF10: 4

## 2025-03-27 ASSESSMENT — PAIN DESCRIPTION - LOCATION
LOCATION: ABDOMEN;INCISION
LOCATION: ABDOMEN

## 2025-03-27 ASSESSMENT — PAIN DESCRIPTION - DESCRIPTORS
DESCRIPTORS: ACHING

## 2025-03-27 NOTE — H&P
Department of Obstetrics and Gynecology   History & Physical    Pt Name: Ela Briggs  MRN: [id number] Acct #: [id number]  YOB: 2000  Estimated Date of Delivery: 3/27/25      HPI: The patient is a 24 y.o.  40w0d female who presents to OB triage for primary elective CD. Risks, benefits and alternative therapies for delivery discussed. Pt elects CD for delivery.     +FM, -VB, -LOF, -CTX    Allergies:    Allergies as of 2025    (No Known Allergies)       Medications:    Current Facility-Administered Medications:     scopolamine (TRANSDERM-SCOP) transdermal patch 1 patch, 1 patch, TransDERmal, Once, Nany Vizcaino MD, 1 patch at 25 0654    lactated ringers infusion, , IntraVENous, Continuous, Nany Vizcaino MD    lactated ringers bolus 1,000 mL, 1,000 mL, IntraVENous, Once, Nany Vizcaino MD, Last Rate: 1,000 mL/hr at 25 0652, 1,000 mL at 25 0652    sodium chloride flush 0.9 % injection 10 mL, 10 mL, IntraVENous, 2 times per day, Nany Vizcaino MD    sodium chloride flush 0.9 % injection 10 mL, 10 mL, IntraVENous, PRN, Nany Vizcaino MD    0.9 % sodium chloride infusion, , IntraVENous, PRN, Nany Vizcaino MD    oxytocin (PITOCIN) 30 units in 500 mL infusion, 87.3 saima-units/min, IntraVENous, Continuous PRN **AND** oxytocin (PITOCIN) 10 unit bolus from the bag, 10 Units, IntraVENous, PRN, Nany Vizcaino MD    ondansetron (ZOFRAN) injection 4 mg, 4 mg, IntraVENous, Q6H PRN, Nany Vizcaino MD    ceFAZolin (ANCEF) 3,000 mg in sterile water 20 mL IV syringe, 3,000 mg, IntraVENous, Once, Nany Vizcaino MD    OB History:     Gyn History: Denies h/o abnormal pap smear, h/o STDs.     Past Medical History:   Past Medical History:   Diagnosis Date    Hypothyroidism        Past Surgical History: History reviewed. No pertinent surgical history.    Social History:   Social History     Tobacco Use   Smoking Status Never    Passive

## 2025-03-27 NOTE — ANESTHESIA POSTPROCEDURE EVALUATION
Department of Anesthesiology  Postprocedure Note    Patient: Ela Briggs  MRN: [id number]  YOB: 2000  Date of evaluation: 3/27/2025    Procedure Summary       Date: 25 Room / Location: [address]    Anesthesia Start: 816 Anesthesia Stop: 907    Procedure:  SECTION Diagnosis:       Surgery, elective      (Surgery, elective [Z41.9])    Surgeons: Nany Vizcaino MD Responsible Provider: Tang Marks APRN - CRNA    Anesthesia Type: spinal ASA Status: 2            Anesthesia Type: spinal    Sim Phase I:      Sim Phase II:      Anesthesia Post Evaluation    Patient location during evaluation: bedside  Patient participation: complete - patient participated  Level of consciousness: awake and awake and alert  Airway patency: patent  Nausea & Vomiting: no nausea and no vomiting  Cardiovascular status: blood pressure returned to baseline and hemodynamically stable  Respiratory status: acceptable  Hydration status: euvolemic  Pain management: adequate        No notable events documented.

## 2025-03-27 NOTE — FLOWSHEET NOTE
Pt refusing to have bales removed at this time. Pt states it is painful to stand up. RN explains it will be more beneficial for the pt to get up and move more. Pt verbalizes understanding.

## 2025-03-27 NOTE — PLAN OF CARE
Problem: Vaginal Birth or  Section  Goal: Fetal and maternal status remain reassuring during the birth process  Description:  Birth OB-Pregnancy care plan goal which identifies if the fetal and maternal status remain reassuring during the birth process  Outcome: Completed     Problem: Postpartum  Goal: Experiences normal postpartum course  Description:  Postpartum OB-Pregnancy care plan goal which identifies if the mother is experiencing a normal postpartum course  Outcome: Progressing  Goal: Appropriate maternal -  bonding  Description:  Postpartum OB-Pregnancy care plan goal which identifies if the mother and  are bonding appropriately  Outcome: Progressing  Goal: Establishment of infant feeding pattern  Description:  Postpartum OB-Pregnancy care plan goal which identifies if the mother is establishing a feeding pattern with their   Outcome: Progressing  Goal: Incisions, wounds, or drain sites healing without S/S of infection  Outcome: Progressing     Problem: Pain  Goal: Verbalizes/displays adequate comfort level or baseline comfort level  Outcome: Progressing     Problem: Infection - Adult  Goal: Absence of infection at discharge  Outcome: Progressing  Goal: Absence of infection during hospitalization  Outcome: Progressing  Goal: Absence of fever/infection during anticipated neutropenic period  Outcome: Progressing     Problem: Safety - Adult  Goal: Free from fall injury  Outcome: Progressing     Problem: Discharge Planning  Goal: Discharge to home or other facility with appropriate resources  Outcome: Progressing

## 2025-03-27 NOTE — OP NOTE
OPERATIVE NOTE:  DELIVERY     24 y.o.  at 40w0d presented to OB triage for primary elective  delivery. Risks, benefits and alternative therapies for  delivery were reviewed with the patient and the patient agrees to proceed with planned primary  delivery.     PreOp Dx: Elective  Section  PostOp Dx: same   Procedure: Low Transverse  section  Surgeon: Charis  Assistant: ANDREY  Anesthesia: Spinal   EBL: 800cc - please see nursing chart/notes for additional EBL  Findings: viable male infant at 08:34 Apgars 9, 9 wt 3136g      Pt was taken to the OR where she was prepped and draped in the normal sterile fashion in a supine position with a leftward tilt. Time out was then performed to confirm proper patient, placement and procedure. After ensuring adequate anesthesia, a Pfannenstiel incision was made, carried to the the fascia which was then incised in the midline and extended laterally with cautery. The rectus muscle was then dissected off the fascia superiorly and the peritoneum was entered bluntly. An Shalom retractor was then placed without difficulty. The hysterotomy was then made and the uterine cavity was entered bluntly. Clear amniotic fluid was noted. The infant's head was then grasped and delivered atraumatically followed quickly by the rest of the body. The infant was then laid on the surgical bed and the cord remained intact for 30 seconds for delayed cord clamping. The cord was then clamped and cut and the infant was handed to the awaiting nurse. The cord blood was then drawn for labs and the placenta delivered spontaneously. The uterus was then cleared of all clots and debris. The hysterotomy was then closed in a running fashion. The hysterotomy was inspected and excellent hemostasis was noted. The Shalom retractor was then removed and the peritoneum was closed in a running fashion followed by the fascia which was closed in a running fashion. The  subcutaneous tissue was then irrigated and re-approximated with suture and the skin was closed with subcuticular absorbable staples. Vital signs were stable throughout, sponge and needle counts were correct x 3 and mother was noted to have excellent uterine tone. Pt was noted to have drainage of clear yellow urine throughout the case. Pt was taken to recovery in stable condition.   The use of a first assistant surgeon was necessary because there was no qualified resident surgeon available. The assistant surgeon assisted in the proper positioning, prepping, and draping of the patient, intraoperative retraction and suctioning for visualization, passing sutures and suture management.    Nany Vizcaino MD

## 2025-03-27 NOTE — ANESTHESIA PROCEDURE NOTES
Spinal Block    End time: 3/27/2025 8:22 AM  Reason for block: primary anesthetic  Staffing  Performed: resident/CRNA   Resident/CRNA: Tang Marks APRN - CRNA  Performed by: Tang Marks APRN - CRNA  Authorized by: Tang Marks APRN - CRNA    Spinal Block  Patient position: sitting  Prep: Betadine  Patient monitoring: continuous pulse ox  Approach: midline  Location: L4/L5  Provider prep: mask and sterile gloves  Needle  Needle type: Pencan   Needle gauge: 24 G  Needle length: 4 in  Kit: mensah pencan spinal tray  Expiration date: 6/30/2026  Assessment  Sensory level: T4  Swirl obtained: Yes  CSF: clear  Attempts: 1  Hemodynamics: stable  Preanesthetic Checklist  Completed: patient identified, IV checked, site marked, risks and benefits discussed, surgical/procedural consents, equipment checked, pre-op evaluation, timeout performed, anesthesia consent given, oxygen available, monitors applied/VS acknowledged, fire risk safety assessment completed and verbalized and blood product R/B/A discussed and consented

## 2025-03-27 NOTE — LACTATION NOTE
This note was copied from a baby's chart.  -assisted with initial feed   -mom is primip, s/p c-sec  -provided with written breastfeeding education materials and reviewed  -counseled on proper positioning, body alignment  -baby latched deeply to right breast in cradle hold  -rhythmic sucking noted and mom denies pain  -offered support and encouragement  -recommend frequent skin to skin, breastfeeding per hunger cues  -monitor diaper counts  -parents verbalize understanding    1340-follow up at request of mother  -assisted in latching baby to left breast in cross cradle hold  -colostrum easily hand expressible  -baby latched deeply, rhythmic sucking noted  -mom denies pain  -offered support and encouragement  -continue breastfeeding on demand

## 2025-03-27 NOTE — LACTATION NOTE
In to visit pt  Mother breast fed on left breast  Infant sleeping  Infant stimulated awake  Infant to right breast with football hold  Demonstrated deep latch and positioning at the breast  Infant latched and has rhythmic sucking  Encouraged mother to breast feed 8-10 times in 24 hours, to eat healthy and drink plenty of fluids  Reviewed intake and output of the , the supply and demand of breast feeding  Informed mother about breast feeding support group and [email or web address]  Mother has a breast pump  Encouraged mother to call for assistance

## 2025-03-28 LAB
HCT VFR BLD AUTO: 30.8 % (ref 37–47)
HGB BLD-MCNC: 10.6 G/DL (ref 12–16)

## 2025-03-28 PROCEDURE — 85018 HEMOGLOBIN: CPT

## 2025-03-28 PROCEDURE — 6360000002 HC RX W HCPCS: Performed by: OBSTETRICS & GYNECOLOGY

## 2025-03-28 PROCEDURE — 85014 HEMATOCRIT: CPT

## 2025-03-28 PROCEDURE — 6370000000 HC RX 637 (ALT 250 FOR IP): Performed by: OBSTETRICS & GYNECOLOGY

## 2025-03-28 PROCEDURE — 2500000003 HC RX 250 WO HCPCS: Performed by: OBSTETRICS & GYNECOLOGY

## 2025-03-28 PROCEDURE — 1220000000 HC SEMI PRIVATE OB R&B

## 2025-03-28 RX ADMIN — FAMOTIDINE 20 MG: 20 TABLET, FILM COATED ORAL at 20:47

## 2025-03-28 RX ADMIN — LEVOTHYROXINE SODIUM 25 MCG: 0.03 TABLET ORAL at 07:00

## 2025-03-28 RX ADMIN — DOCUSATE SODIUM 100 MG: 100 CAPSULE, LIQUID FILLED ORAL at 09:43

## 2025-03-28 RX ADMIN — IBUPROFEN 800 MG: 800 TABLET, FILM COATED ORAL at 09:43

## 2025-03-28 RX ADMIN — KETOROLAC TROMETHAMINE 30 MG: 30 INJECTION, SOLUTION INTRAMUSCULAR at 04:10

## 2025-03-28 RX ADMIN — IBUPROFEN 800 MG: 800 TABLET, FILM COATED ORAL at 19:24

## 2025-03-28 RX ADMIN — SODIUM CHLORIDE, PRESERVATIVE FREE 10 ML: 5 INJECTION INTRAVENOUS at 04:10

## 2025-03-28 RX ADMIN — FAMOTIDINE 20 MG: 20 TABLET, FILM COATED ORAL at 09:43

## 2025-03-28 RX ADMIN — PRENATAL VIT W/ FE FUMARATE-FA TAB 27-0.8 MG 1 TABLET: 27-0.8 TAB at 09:43

## 2025-03-28 ASSESSMENT — PAIN SCALES - GENERAL: PAINLEVEL_OUTOF10: 3

## 2025-03-28 ASSESSMENT — PAIN DESCRIPTION - LOCATION: LOCATION: ABDOMEN

## 2025-03-28 NOTE — PLAN OF CARE
Problem: Chronic Conditions and Co-morbidities  Goal: Patient's chronic conditions and co-morbidity symptoms are monitored and maintained or improved  3/27/2025 2240 by Judi Hargrove RN  Outcome: Progressing  3/27/2025 2238 by Judi Hargrove RN  Outcome: Progressing  3/27/2025 0948 by Camron Aragon RN  Outcome: Progressing

## 2025-03-28 NOTE — PLAN OF CARE
Problem: Postpartum  Goal: Experiences normal postpartum course  Description:  Postpartum OB-Pregnancy care plan goal which identifies if the mother is experiencing a normal postpartum course  3/28/2025 1016 by Kaykay Reddy RN  Outcome: Progressing  3/27/2025 224 by Judi Hargrove RN  Outcome: Progressing  3/27/2025 223 by Judi Hargrove RN  Outcome: Progressing  Goal: Appropriate maternal -  bonding  Description:  Postpartum OB-Pregnancy care plan goal which identifies if the mother and  are bonding appropriately  3/28/2025 1016 by Kaykay Reddy RN  Outcome: Progressing  3/27/2025 224 by Judi Hargrove RN  Outcome: Progressing  3/27/2025 223 by Judi Hargrove RN  Outcome: Progressing  Goal: Establishment of infant feeding pattern  Description:  Postpartum OB-Pregnancy care plan goal which identifies if the mother is establishing a feeding pattern with their   3/28/2025 1016 by Kaykay Reddy RN  Outcome: Progressing  3/27/2025 224 by Judi Hargrove RN  Outcome: Progressing  3/27/2025 223 by Judi Hargrove RN  Outcome: Progressing  Goal: Incisions, wounds, or drain sites healing without S/S of infection  3/28/2025 1016 by Kaykay Reddy RN  Outcome: Progressing  3/27/2025 224 by Judi Hargrove RN  Outcome: Progressing  3/27/2025 223 by Judi Hargrove RN  Outcome: Progressing     Problem: Pain  Goal: Verbalizes/displays adequate comfort level or baseline comfort level  3/28/2025 1016 by Kaykay Reddy RN  Outcome: Progressing  3/27/2025 224 by Judi Hargrove RN  Outcome: Progressing  3/27/2025 223 by Judi Hargrove RN  Outcome: Progressing     Problem: Infection - Adult  Goal: Absence of infection at discharge  3/28/2025 1016 by Kaykay Reddy RN  Outcome: Progressing  3/27/2025 224 by Judi Hargrove RN  Outcome: Progressing  3/27/2025 223 by Judi Hargrove RN  Outcome: Progressing  Goal: Absence of infection during hospitalization  3/28/2025 1016 by Kaykay Reddy RN  Outcome:

## 2025-03-28 NOTE — LACTATION NOTE
This note was copied from a baby's chart.  -assisted in latching baby to left breast in cradle hold  -infant latched easily, rhythmic sucking noted and mom denies pain  -baby has + output and weight is WNL  -parents report difficulty with feeds overnight  -OT consult pending   -recommend frequent skin to skin, offer breast more frequently  -call for assistance with feeds as needed    1345-follow up  -mom report just finished feeding   -was able to latch baby independently  -infant continues to have + output  -offered support and encouragement  -educated re: importance of feeding at least 8-10x/day  -mom verbalized understanding of teaching  -reviewed opportunities for outpatient lactation follow up

## 2025-03-28 NOTE — PLAN OF CARE
Problem: Vaginal Birth or  Section  Goal: Fetal and maternal status remain reassuring during the birth process  Description:  Birth OB-Pregnancy care plan goal which identifies if the fetal and maternal status remain reassuring during the birth process  3/27/2025 0948 by Camron Aragon RN  Outcome: Completed     Problem: Postpartum  Goal: Experiences normal postpartum course  Description:  Postpartum OB-Pregnancy care plan goal which identifies if the mother is experiencing a normal postpartum course  3/27/2025 2238 by Judi Hargrove RN  Outcome: Progressing  3/27/2025 0948 by Camron Aragon RN  Outcome: Progressing  Goal: Appropriate maternal -  bonding  Description:  Postpartum OB-Pregnancy care plan goal which identifies if the mother and  are bonding appropriately  3/27/2025 2238 by Judi Hargrove RN  Outcome: Progressing  3/27/2025 0948 by Camron Aragon RN  Outcome: Progressing  Goal: Establishment of infant feeding pattern  Description:  Postpartum OB-Pregnancy care plan goal which identifies if the mother is establishing a feeding pattern with their   3/27/2025 2238 by Judi Hargrove RN  Outcome: Progressing  3/27/2025 0948 by Camron Aragon RN  Outcome: Progressing  Goal: Incisions, wounds, or drain sites healing without S/S of infection  3/27/2025 2238 by Judi Hargrove RN  Outcome: Progressing  3/27/2025 0948 by Camron Aragon RN  Outcome: Progressing     Problem: Pain  Goal: Verbalizes/displays adequate comfort level or baseline comfort level  3/27/2025 2238 by Judi Hargrove RN  Outcome: Progressing  3/27/2025 0948 by Camron Aragon RN  Outcome: Progressing     Problem: Infection - Adult  Goal: Absence of infection at discharge  3/27/2025 2238 by Judi Hargrove RN  Outcome: Progressing  3/27/2025 0948 by Camron Aragon RN  Outcome: Progressing  Goal: Absence of infection during hospitalization  3/27/2025 2238 by Judi Hargrove RN  Outcome: Progressing  3/27/2025 0948 by Camron Aragon RN  Outcome:  Progressing  Goal: Absence of fever/infection during anticipated neutropenic period  3/27/2025 2238 by Judi Hargrove RN  Outcome: Progressing  3/27/2025 0948 by Camron Aragon RN  Outcome: Progressing     Problem: Safety - Adult  Goal: Free from fall injury  3/27/2025 2238 by Judi Hargrove RN  Outcome: Progressing  3/27/2025 0948 by Camron Aragon RN  Outcome: Progressing     Problem: Discharge Planning  Goal: Discharge to home or other facility with appropriate resources  3/27/2025 2238 by Judi Hargrove RN  Outcome: Progressing  3/27/2025 0948 by Camron Aragon RN  Outcome: Progressing     Problem: Chronic Conditions and Co-morbidities  Goal: Patient's chronic conditions and co-morbidity symptoms are monitored and maintained or improved  3/27/2025 2238 by Judi Hargrove RN  Outcome: Progressing  3/27/2025 0948 by Camron Aragon RN  Outcome: Progressing

## 2025-03-28 NOTE — CARE COORDINATION
Reason for consult: positive for THC throughout pregnancy. (10/28/2024; 2025; 3/27/2025)    Baby Boy:   : 3/27/2025  FOB:   Address: [address] [place] [address]  Contact: [phone] (Tania, Grandma phone)   Maternal Great Grandmother: Tania Ortiz   Contact: [phone]    Pt positive for COVID. Tried to call in to room. Room phone not working.   Notified OB staff.     Electronically signed by MEDARDO Yusuf on 3/28/2025 at 11:40 AM    Reason for consult: positive for THC throughout pregnancy. (10/28/2024; 2025; 3/27/2025)    Baby Boy: Mo Ca   : 3/27/2025  UDS: positive for THC on 3/27/2025  FOB: Braulio Ca   Address: [address] [place], [place] [id number]  Contact: [phone] (Tania, Grandma phone)   Maternal Great Grandmother: Tania Ortiz   Contact: [phone]    LSW spoke with pt via phone. Pt is positive for COVID. Pt is alert and oriented x4. Pt lives at home with FOB and family. This is first baby. Pt report there is everything at home for baby. No transportation or financial issues at this time.   Pt is connected with [facility]. Discussed other community resources with pt. Pt verbalized understanding.   Pt report friends and family is able to help out with baby if need it.   Pt denies any hx with DV, CPS, or mental health at this time.     Discussed positive THC with pt. Pt report she did not have health insurance at the time. Was not able to keep food in. Was losing weight and Marijuana was the only thing that kept her from throwing up.   No medical marijuana card at this time.   Last time of use it was about couple weeks ago.     LSW sent referral to [facility]. Spoke with Dona, .   Per Dona, pt is able to go home with baby at the time of DC.   CPS will sent referral to [facility] to reach out to pt for education.     Updated OB staff.     Electronically signed by MEDARDO Yusuf on 3/28/2025 at 3:48 PM

## 2025-03-28 NOTE — PROGRESS NOTES
PROGRESS NOTE:  POSTOP DAY 1    Pt doing well. Pt ambulating, tolerating po and voiding without issue. Pt bottle feeding without issue.     /67   Pulse (!) 48   Temp 98 °F (36.7 °C) (Oral)   Resp 16   LMP 2024   SpO2 99%   Breastfeeding Unknown   Hemoglobin   Date Value Ref Range Status   2025 10.6 (L) 12.0 - 16.0 g/dL Final     CVS: RRR  Lungs: CTAB  ABD: soft, NT, uterus firm at umbilicus  Pfannenstiel incision without erythema  EXT: no c/c/e    24 y.o.  now P1 s/p  delivery doing well POD#1.   1. Routine postop care   2. May discharge to home when infant released  3. Rx percocet and Ibuprofen  4. F/u with Dr. Vizcaino in 2wks    Nany Vizcaino MD

## 2025-03-29 VITALS
HEART RATE: 74 BPM | SYSTOLIC BLOOD PRESSURE: 133 MMHG | DIASTOLIC BLOOD PRESSURE: 77 MMHG | TEMPERATURE: 97.8 F | OXYGEN SATURATION: 96 % | RESPIRATION RATE: 16 BRPM

## 2025-03-29 PROCEDURE — 6370000000 HC RX 637 (ALT 250 FOR IP): Performed by: OBSTETRICS & GYNECOLOGY

## 2025-03-29 RX ORDER — OXYCODONE HYDROCHLORIDE 5 MG/1
5 TABLET ORAL EVERY 8 HOURS PRN
Qty: 8 TABLET | Refills: 0 | Status: SHIPPED | OUTPATIENT
Start: 2025-03-29 | End: 2025-04-01

## 2025-03-29 RX ORDER — ACETAMINOPHEN 500 MG
1000 TABLET ORAL EVERY 8 HOURS PRN
Qty: 120 TABLET | Refills: 3 | Status: SHIPPED | OUTPATIENT
Start: 2025-03-29

## 2025-03-29 RX ORDER — IBUPROFEN 800 MG/1
800 TABLET, FILM COATED ORAL EVERY 8 HOURS
Qty: 120 TABLET | Refills: 3 | Status: SHIPPED | OUTPATIENT
Start: 2025-03-29

## 2025-03-29 RX ORDER — PSEUDOEPHEDRINE HCL 30 MG
100 TABLET ORAL DAILY
Qty: 30 CAPSULE | Refills: 1 | Status: SHIPPED | OUTPATIENT
Start: 2025-03-30

## 2025-03-29 RX ADMIN — IBUPROFEN 800 MG: 800 TABLET, FILM COATED ORAL at 01:42

## 2025-03-29 RX ADMIN — IBUPROFEN 800 MG: 800 TABLET, FILM COATED ORAL at 10:05

## 2025-03-29 RX ADMIN — FAMOTIDINE 20 MG: 20 TABLET, FILM COATED ORAL at 07:41

## 2025-03-29 RX ADMIN — ACETAMINOPHEN 1000 MG: 500 TABLET ORAL at 10:04

## 2025-03-29 RX ADMIN — LEVOTHYROXINE SODIUM 25 MCG: 0.03 TABLET ORAL at 07:41

## 2025-03-29 RX ADMIN — ACETAMINOPHEN 1000 MG: 500 TABLET ORAL at 01:42

## 2025-03-29 RX ADMIN — PRENATAL VIT W/ FE FUMARATE-FA TAB 27-0.8 MG 1 TABLET: 27-0.8 TAB at 07:41

## 2025-03-29 RX ADMIN — DOCUSATE SODIUM 100 MG: 100 CAPSULE, LIQUID FILLED ORAL at 07:41

## 2025-03-29 ASSESSMENT — PAIN SCALES - GENERAL
PAINLEVEL_OUTOF10: 0
PAINLEVEL_OUTOF10: 0

## 2025-03-29 NOTE — DISCHARGE INSTRUCTIONS
Breast feeding support group meets every Thursday here on maternity at 11:00 AM    [email or web address] is a good resource about breast feeding

## 2025-03-29 NOTE — PLAN OF CARE
Problem: Postpartum  Goal: Experiences normal postpartum course  Description:  Postpartum OB-Pregnancy care plan goal which identifies if the mother is experiencing a normal postpartum course  3/29/2025 0933 by Melissa Gibbons RN  Outcome: Progressing  3/28/2025 2247 by Judi Hargrove RN  Outcome: Progressing  Goal: Appropriate maternal -  bonding  Description:  Postpartum OB-Pregnancy care plan goal which identifies if the mother and  are bonding appropriately  3/29/2025 0933 by Melissa Gibbons RN  Outcome: Progressing  3/28/2025 2247 by Judi Hargrove RN  Outcome: Progressing  Goal: Establishment of infant feeding pattern  Description:  Postpartum OB-Pregnancy care plan goal which identifies if the mother is establishing a feeding pattern with their   3/29/2025 0933 by Melissa Gibbons RN  Outcome: Progressing  3/28/2025 2247 by Judi Hargrove RN  Outcome: Progressing  Goal: Incisions, wounds, or drain sites healing without S/S of infection  3/29/2025 0933 by Melissa Gibbons RN  Outcome: Progressing  3/28/2025 2247 by Judi Hargrove RN  Outcome: Progressing     Problem: Pain  Goal: Verbalizes/displays adequate comfort level or baseline comfort level  3/29/2025 0933 by Melissa Gibbons RN  Outcome: Progressing  3/28/2025 2247 by Judi Hargrove RN  Outcome: Progressing     Problem: Infection - Adult  Goal: Absence of infection at discharge  3/29/2025 0933 by Melissa Gibbons RN  Outcome: Progressing  3/28/2025 2247 by Judi Hargrove RN  Outcome: Progressing  Goal: Absence of infection during hospitalization  3/29/2025 0933 by Melissa Gibbons RN  Outcome: Progressing  3/28/2025 2247 by Judi Hargrove RN  Outcome: Progressing  Goal: Absence of fever/infection during anticipated neutropenic period  3/29/2025 0933 by Melissa Gibbons RN  Outcome: Progressing  3/28/2025 2247 by Judi Hargrove RN  Outcome: Progressing     Problem: Safety - Adult  Goal: Free from fall

## 2025-03-29 NOTE — PLAN OF CARE
Problem: Postpartum  Goal: Experiences normal postpartum course  Description:  Postpartum OB-Pregnancy care plan goal which identifies if the mother is experiencing a normal postpartum course  3/28/2025 224 by Judi Hargrove RN  Outcome: Progressing  3/28/2025 1016 by Kaykay Reddy RN  Outcome: Progressing  Goal: Appropriate maternal -  bonding  Description:  Postpartum OB-Pregnancy care plan goal which identifies if the mother and  are bonding appropriately  3/28/2025 224 by Judi Hargrove RN  Outcome: Progressing  3/28/2025 1016 by Kaykay Reddy RN  Outcome: Progressing  Goal: Establishment of infant feeding pattern  Description:  Postpartum OB-Pregnancy care plan goal which identifies if the mother is establishing a feeding pattern with their   3/28/2025 2247 by Judi Hargrove RN  Outcome: Progressing  3/28/2025 1016 by Kaykay Reddy RN  Outcome: Progressing  Goal: Incisions, wounds, or drain sites healing without S/S of infection  3/28/2025 2247 by Judi Hargrove RN  Outcome: Progressing  3/28/2025 1016 by Kaykay Reddy RN  Outcome: Progressing     Problem: Pain  Goal: Verbalizes/displays adequate comfort level or baseline comfort level  3/28/2025 2247 by Judi Hargrove RN  Outcome: Progressing  3/28/2025 1016 by Kaykay Reddy RN  Outcome: Progressing     Problem: Infection - Adult  Goal: Absence of infection at discharge  3/28/2025 2247 by Judi Hargrove RN  Outcome: Progressing  3/28/2025 1016 by Kaykay Reddy RN  Outcome: Progressing  Goal: Absence of infection during hospitalization  3/28/2025 2247 by Judi Hargrove RN  Outcome: Progressing  3/28/2025 1016 by Kaykay Reddy RN  Outcome: Progressing  Goal: Absence of fever/infection during anticipated neutropenic period  3/28/2025 2247 by Judi Hargrove RN  Outcome: Progressing  3/28/2025 1016 by Kaykay Reddy RN  Outcome: Progressing     Problem: Safety - Adult  Goal: Free from fall injury  3/28/2025 2247 by Judi Hargrove RN  Outcome:

## 2025-03-29 NOTE — LACTATION NOTE
Mother attempting to breast feed infant sleepy  Instructed mother how to wake infant  Stimulated infant awake  Infant to right breast with football hold  Infant latched and sucking  Infant nursed for 12 minutes  Infant to left breast with football hold  Infant latched and sucking  Infant nursed for 5 minutes then asleep    Instructed mother on how to operate and clean electric breast pump  Mother pumping her breast for 15 minutes drops return and given to infant    Infant awake and rooting  Infant to left breast with football hold  Infant latched and sucking    Reviewed the supply and demand of breast feeding, the intake and output of the   Informed mother about breast feeding support group and [email or web address]  Encouraged mother to call for assistance  Breast feeding folder given to pt   +itching, +swelling

## 2025-03-29 NOTE — FLOWSHEET NOTE
Discharge   care booklet, Discharge instructions, and safe sleep information reviewed with MOB. Questions answered. Verbalizes understanding.

## 2025-03-29 NOTE — DISCHARGE SUMMARY
Obstetric Discharge Summary    Reasons for Admission on 3/27/2025  5:58 AM   Section (Primary)    Prenatal Procedures  None    Intrapartum Procedures  Vaginal Delivery    Postpartum Procedures  None    [place] Data  Information for the patient's :  Ronald Briggs [[id number]]   male   Birth Weight: 3.136 kg (6 lb 14.6 oz)  Discharge With Mother  Complications: Yes - Covid    Discharge Diagnosis  Patient Active Problem List    Diagnosis Date Noted    Term pregnancy 2025    Obesity during pregnancy 2025    Hypothyroid in pregnancy, antepartum, second trimester 2024       Discharge Information  Current Discharge Medication List        START taking these medications    Details   oxyCODONE (ROXICODONE) 5 MG immediate release tablet Take 1 tablet by mouth every 8 hours as needed for Pain for up to 3 days. Max Daily Amount: 15 mg  Qty: 8 tablet, Refills: 0    Comments: Reduce doses taken as pain becomes manageable  Associated Diagnoses: S/P       acetaminophen (TYLENOL) 500 MG tablet Take 2 tablets by mouth every 8 hours as needed for Pain  Qty: 120 tablet, Refills: 3      ibuprofen (ADVIL;MOTRIN) 800 MG tablet Take 1 tablet by mouth in the morning and 1 tablet at noon and 1 tablet in the evening.  Qty: 120 tablet, Refills: 3      docusate sodium (COLACE, DULCOLAX) 100 MG CAPS Take 100 mg by mouth daily  Qty: 30 capsule, Refills: 1           CONTINUE these medications which have NOT CHANGED    Details   Prenatal MV-Min-Fe Fum-FA-DHA (PRENATAL/FOLIC ACID+DHA PO) Take by mouth      levothyroxine (SYNTHROID) 25 MCG tablet Take 1 tablet by mouth daily  Qty: 90 tablet, Refills: 3    Associated Diagnoses: Hypothyroid in pregnancy, antepartum, second trimester      famotidine (PEPCID) 20 MG tablet Take 1 tablet by mouth 2 times daily  Qty: 180 tablet, Refills: 1      Psyllium (DAILY FIBER PO) Take 1 capsule by mouth daily      ondansetron (ZOFRAN-ODT) 4 MG disintegrating tablet

## 2025-03-29 NOTE — PROGRESS NOTES
Subjective:       C Section Postpartum Progress Note    Subjective:      24 y.o.   @ 40w0d    Postpartum Day 2:  Delivery for Primary     The patient feels well. The patient denies emotional concerns. Pain is well controlled with current medications. Urinary output is adequate. The patient is ambulating well. The patient is tolerating a normal diet. Flatus denies been passed.    Objective:      Patient Vitals for the past 8 hrs:   BP Temp Temp src Pulse Resp SpO2   25 0411 113/76 97.9 °F (36.6 °C) Oral 69 16 97 %     General:    alert, appears stated age, and cooperative   Bowel Sounds:  active   Lochia:  appropriate   Uterine Fundus:   firm   Incision:  healing well, no significant drainage, no dehiscence, no significant erythema   DVT Evaluation:  No evidence of DVT seen on physical exam.         Assessment:     Status post  section. Doing well postoperatively.     Plan:   1.Routine post op care    Discharge home with standard precautions and return to clinic in 4-6 weeks.    Justin Salvador DO, MBA, FACOOG, FACOG  2025 9:01 AM

## 2025-04-02 ENCOUNTER — TELEPHONE (OUTPATIENT)
Dept: OBGYN CLINIC | Age: 25
End: 2025-04-02

## 2025-04-02 RX ORDER — DOCUSATE SODIUM 100 MG/1
100 CAPSULE, LIQUID FILLED ORAL 2 TIMES DAILY PRN
Qty: 180 CAPSULE | Refills: 3 | Status: SHIPPED | OUTPATIENT
Start: 2025-04-02

## 2025-04-02 NOTE — TELEPHONE ENCOUNTER
Received a call from the patient who stated she is experiencing stomach pain,constipation,swollen feet and bleeding with clots.Patient answered no when asked if she was experiencing heavy bleeding or going over an overnight pad every hour to two hours.Patient was made aware to keep feet elevated,drink water,script for constipation will be sent to her pharmacy,if was to experience heavy bleeding to go to the ER.

## 2025-04-10 ENCOUNTER — OFFICE VISIT (OUTPATIENT)
Dept: OBGYN CLINIC | Age: 25
End: 2025-04-10

## 2025-04-10 VITALS
SYSTOLIC BLOOD PRESSURE: 118 MMHG | HEIGHT: 64 IN | BODY MASS INDEX: 35.85 KG/M2 | WEIGHT: 210 LBS | DIASTOLIC BLOOD PRESSURE: 72 MMHG

## 2025-04-10 PROCEDURE — 99024 POSTOP FOLLOW-UP VISIT: CPT | Performed by: OBSTETRICS & GYNECOLOGY

## (undated) DEVICE — STERILE NEOPRENE POWDER-FREE SURGICAL GLOVES WITH NITRILE COATING: Brand: PROTEXIS

## (undated) DEVICE — SUTURE VICRYL SZ 1 L36IN ABSRB UD L36MM CT-1 1/2 CIR J947H

## (undated) DEVICE — DRESSING TELFA STRL 3X6

## (undated) DEVICE — STANDARD SURGICAL GOWN, L: Brand: CONVERTORS

## (undated) DEVICE — ISLAND DRESSING 2IN X 3IN: Brand: SILVERLON ANTIMICROBIAL WOUND DRESSING